# Patient Record
Sex: FEMALE | Race: WHITE | Employment: OTHER | URBAN - METROPOLITAN AREA
[De-identification: names, ages, dates, MRNs, and addresses within clinical notes are randomized per-mention and may not be internally consistent; named-entity substitution may affect disease eponyms.]

---

## 2017-02-13 ENCOUNTER — OFFICE VISIT (OUTPATIENT)
Dept: FAMILY MEDICINE CLINIC | Facility: CLINIC | Age: 67
End: 2017-02-13

## 2017-02-13 VITALS
HEIGHT: 59 IN | HEART RATE: 68 BPM | DIASTOLIC BLOOD PRESSURE: 70 MMHG | BODY MASS INDEX: 29.64 KG/M2 | RESPIRATION RATE: 14 BRPM | SYSTOLIC BLOOD PRESSURE: 120 MMHG | OXYGEN SATURATION: 99 % | WEIGHT: 147 LBS | TEMPERATURE: 97 F

## 2017-02-13 DIAGNOSIS — Z12.31 ENCOUNTER FOR SCREENING MAMMOGRAM FOR BREAST CANCER: ICD-10-CM

## 2017-02-13 DIAGNOSIS — R92.8 ABNORMAL FINDING ON MAMMOGRAPHY: ICD-10-CM

## 2017-02-13 DIAGNOSIS — Z12.11 SCREEN FOR COLON CANCER: ICD-10-CM

## 2017-02-13 DIAGNOSIS — Z00.00 MEDICARE ANNUAL WELLNESS VISIT, INITIAL: Primary | ICD-10-CM

## 2017-02-13 DIAGNOSIS — R92.2 BREAST DENSITY: ICD-10-CM

## 2017-02-13 DIAGNOSIS — R73.9 HYPERGLYCEMIA: ICD-10-CM

## 2017-02-13 PROCEDURE — 99213 OFFICE O/P EST LOW 20 MIN: CPT | Performed by: FAMILY MEDICINE

## 2017-02-13 PROCEDURE — 90732 PPSV23 VACC 2 YRS+ SUBQ/IM: CPT | Performed by: FAMILY MEDICINE

## 2017-02-13 PROCEDURE — G0439 PPPS, SUBSEQ VISIT: HCPCS | Performed by: FAMILY MEDICINE

## 2017-02-13 PROCEDURE — G0009 ADMIN PNEUMOCOCCAL VACCINE: HCPCS | Performed by: FAMILY MEDICINE

## 2017-02-13 NOTE — PROGRESS NOTES
HPI:   Nickolas Ewing is a 78year old female who presents for a Medicare Initial Preventative Physical Exam (Welcome to Medicare- < 12 months on Medicare) and discuss cough, and hyperglycemia,  Abnormal mammogram, osteopenia.     Pt got sick at the end HGB 12.8 01/27/2016   .0 01/27/2016        ALLERGIES:   She has No Known Allergies.     CURRENT MEDICATIONS:     Outpatient Prescriptions Marked as Taking for the 2/13/17 encounter (Office Visit) with Lamin Jules MD:  Mometasone Furoate (AS Acuity  Right Eye Visual Acuity: Uncorrected Right Eye Chart Acuity: 20/20   Left Eye Visual Acuity: Uncorrected Left Eye Chart Acuity: 20/50   Both Eyes Visual Acuity: Uncorrected Both Eyes Chart Acuity: 20/20   Able To Tolerate Visual Acuity: No (started for this visit:    Medicare annual wellness visit, initial    Breast density  -     TRACI SRIDHAR 2D+3D DIAGNOSTIC TRACI  BILAT (CPT=77066/92323); Future    Abnormal finding on mammography  -     TRACI SRIDHAR 2D+3D DIAGNOSTIC TRACI  BILAT (CPT=77066/82364);  Future    E mental well-being?: Social Interaction; Visiting Friends; Visiting Family    If you are a male age 38-65 or a female age 47-67, do you take aspirin?: No    Have you had any immunizations at another office such as Influenza, Hepatitis B, Tetanus, or Pneumococ What day of the week is this?: Correct    What month is it?: Correct    What year is it?: Correct    Recall \"Ball\": Correct    Recall \"Flag\": Correct    Recall \"Tree\": Correct       This section provided for quick review of chart, separate sheet to previous visit.  Update Immunization Activity if applicable    Pneumoccocal 13 (Prevnar)   Orders placed or performed in visit on 01/18/16  -PNEUMOCOCCAL VACC, 13 LOGAN IM         Pneumococcal 23 (Pneumovax)   Orders placed or performed in visit on 02/13/17

## 2017-02-13 NOTE — PATIENT INSTRUCTIONS
Return for fasting blood work. Call [phone] to schedule Mammogram.  Call GI to schedule colonoscopy. Use Asmanex 1 puff twice a day for 1- 2 weeks, then stop. Calcium Citrate 1000 mg daily with Vit D 3 1000 units daily.      [facility] following criteria:   • Men who are 73-68 years old and have smoked more than 100 cigarettes in their lifetime   • Anyone with a family history    Colorectal Cancer Screening  Covered up to Age 76     Colonoscopy Screen   Covered every 10 years- more often Annually No orders found for this or any previous visit.  Please get every year    Pneumococcal 13 (Prevnar)  Covered Once after 65   Orders placed or performed in visit on 01/18/16  -PNEUMOCOCCAL VACC, 13 LOGAN IM    Please get once after your 65th birthday

## 2017-02-16 ENCOUNTER — APPOINTMENT (OUTPATIENT)
Dept: LAB | Age: 67
End: 2017-02-16
Attending: FAMILY MEDICINE
Payer: MEDICARE

## 2017-02-16 DIAGNOSIS — R73.9 HYPERGLYCEMIA: ICD-10-CM

## 2017-02-16 LAB
ALBUMIN SERPL-MCNC: 3.6 G/DL (ref 3.5–4.8)
ALP LIVER SERPL-CCNC: 123 U/L (ref 55–142)
ALT SERPL-CCNC: 56 U/L (ref 14–54)
AST SERPL-CCNC: 27 U/L (ref 15–41)
BILIRUB SERPL-MCNC: 0.5 MG/DL (ref 0.1–2)
BUN BLD-MCNC: 17 MG/DL (ref 8–20)
CALCIUM BLD-MCNC: 9.1 MG/DL (ref 8.3–10.3)
CHLORIDE: 104 MMOL/L (ref 101–111)
CO2: 27 MMOL/L (ref 22–32)
CREAT BLD-MCNC: 0.74 MG/DL (ref 0.55–1.02)
EST. AVERAGE GLUCOSE BLD GHB EST-MCNC: 192 MG/DL (ref 68–126)
GLUCOSE BLD-MCNC: 189 MG/DL (ref 70–99)
HBA1C MFR BLD HPLC: 8.3 % (ref ?–5.7)
M PROTEIN MFR SERPL ELPH: 7.3 G/DL (ref 6.1–8.3)
POTASSIUM SERPL-SCNC: 4.2 MMOL/L (ref 3.6–5.1)
SODIUM SERPL-SCNC: 138 MMOL/L (ref 136–144)

## 2017-02-16 PROCEDURE — 36415 COLL VENOUS BLD VENIPUNCTURE: CPT

## 2017-02-16 PROCEDURE — 80053 COMPREHEN METABOLIC PANEL: CPT

## 2017-02-16 PROCEDURE — 83036 HEMOGLOBIN GLYCOSYLATED A1C: CPT

## 2017-02-27 ENCOUNTER — OFFICE VISIT (OUTPATIENT)
Dept: FAMILY MEDICINE CLINIC | Facility: CLINIC | Age: 67
End: 2017-02-27

## 2017-02-27 VITALS
BODY MASS INDEX: 29.84 KG/M2 | TEMPERATURE: 98 F | HEIGHT: 59 IN | DIASTOLIC BLOOD PRESSURE: 60 MMHG | HEART RATE: 76 BPM | OXYGEN SATURATION: 98 % | RESPIRATION RATE: 16 BRPM | WEIGHT: 148 LBS | SYSTOLIC BLOOD PRESSURE: 100 MMHG

## 2017-02-27 DIAGNOSIS — E11.9 NEW ONSET TYPE 2 DIABETES MELLITUS (HCC): Primary | ICD-10-CM

## 2017-02-27 DIAGNOSIS — R79.89 ELEVATED LIVER FUNCTION TESTS: ICD-10-CM

## 2017-02-27 PROCEDURE — 99214 OFFICE O/P EST MOD 30 MIN: CPT | Performed by: FAMILY MEDICINE

## 2017-02-27 RX ORDER — METFORMIN HYDROCHLORIDE 500 MG/1
500 TABLET, EXTENDED RELEASE ORAL
Qty: 90 TABLET | Refills: 1 | Status: SHIPPED | OUTPATIENT
Start: 2017-02-27 | End: 2017-04-06

## 2017-02-27 NOTE — PATIENT INSTRUCTIONS
Metformin extended release 500 mg 1 tablet daily with breakfast.  Get a glucometer test strips and lancets and see diabetic education for education on diabetes and diet. Low carbohydrate diet. Decreased breath rice pasta and potatoes and sweets.   Eye do

## 2017-02-27 NOTE — PROGRESS NOTES
Cara Joseph is a 78year old female. cc new-onset diabetes elevated liver tests  HPI:   Patient is coming to the office to discuss her blood test results. Hemoglobin A1c was elevated at 8.3 blood sugar was 189.   She is a family history of diabetes in h bruits  LUNGS: clear to auscultation  CARDIO: RRR without murmur  GI: good BS's,no masses, HSM or tenderness  EXTREMITIES: no cyanosis, clubbing or edema   Feet examination was performed with socks and shoes off- Dryness of the skin of the feet, no excoria these issues and agrees to the plan. The patient is asked to return in 1 month. Time spent was 25 min, more than 50% was spent on counseling regarding medical conditions and treatment.

## 2017-03-03 ENCOUNTER — NURSE ONLY (OUTPATIENT)
Dept: ENDOCRINOLOGY CLINIC | Facility: CLINIC | Age: 67
End: 2017-03-03

## 2017-03-03 VITALS — BODY MASS INDEX: 30 KG/M2 | WEIGHT: 149 LBS

## 2017-03-03 DIAGNOSIS — E11.9 NEW ONSET TYPE 2 DIABETES MELLITUS (HCC): Primary | ICD-10-CM

## 2017-03-03 PROCEDURE — G0108 DIAB MANAGE TRN  PER INDIV: HCPCS | Performed by: DIETITIAN, REGISTERED

## 2017-03-03 RX ORDER — LANCETS 28 GAUGE
EACH MISCELLANEOUS
Qty: 1 BOX | Refills: 0 | COMMUNITY
Start: 2017-03-03 | End: 2017-08-24

## 2017-03-03 NOTE — PROGRESS NOTES
Chely Cheng  : 3/2/1950 attended Step 1 Diabetic Education:    Date: 3/3/2017   Start time: 10am End time: 11:30am    Wt 149 lb      HGBA1C (%)   Date Value   2017 8.3*   ----------     Depression Screen - 0    Diabetes Overview, pathophysiolo

## 2017-03-15 ENCOUNTER — DIABETIC EDUCATION (OUTPATIENT)
Dept: ENDOCRINOLOGY CLINIC | Facility: CLINIC | Age: 67
End: 2017-03-15

## 2017-03-15 DIAGNOSIS — E11.9 NEW ONSET TYPE 2 DIABETES MELLITUS (HCC): Primary | ICD-10-CM

## 2017-03-15 PROCEDURE — G0109 DIAB MANAGE TRN IND/GROUP: HCPCS | Performed by: DIETITIAN, REGISTERED

## 2017-03-15 NOTE — PROGRESS NOTES
Jose Miguel Rodas  DOB3/2/1950 attended Step 2 Diabetic Education:    Date: 3/15/2017  Start time: 2 pm  End time: 4 pm      Diabetes Overview, pathophysiology, pre-diabetes, A1C results and treatment options for diabetes self-management, types of diabetes a

## 2017-03-22 ENCOUNTER — DIABETIC EDUCATION (OUTPATIENT)
Dept: ENDOCRINOLOGY CLINIC | Facility: CLINIC | Age: 67
End: 2017-03-22

## 2017-03-22 DIAGNOSIS — E11.9 NEW ONSET TYPE 2 DIABETES MELLITUS (HCC): Primary | ICD-10-CM

## 2017-03-22 PROCEDURE — G0109 DIAB MANAGE TRN IND/GROUP: HCPCS | Performed by: DIETITIAN, REGISTERED

## 2017-03-22 NOTE — PROGRESS NOTES
Kevan Coleen  DOB3/2/1950 attended Step 3 Diabetic Education:    Date: 3/22/2017  Start time:  2 pm  End time: 3 pm      Prevention, detection and treatment of chronic complications  Reviewed how to reduce risks of complications including eye, foot, den

## 2017-03-30 ENCOUNTER — DIABETIC EDUCATION (OUTPATIENT)
Dept: ENDOCRINOLOGY CLINIC | Facility: CLINIC | Age: 67
End: 2017-03-30

## 2017-03-30 VITALS — WEIGHT: 147 LBS | BODY MASS INDEX: 29.64 KG/M2 | HEIGHT: 59 IN

## 2017-03-30 DIAGNOSIS — E11.9 NEW ONSET TYPE 2 DIABETES MELLITUS (HCC): Primary | ICD-10-CM

## 2017-03-30 PROCEDURE — 99420 DIABETES SCREEN: CPT | Performed by: DIETITIAN, REGISTERED

## 2017-03-30 NOTE — PROGRESS NOTES
3/30/17    CGM Research Study    Anayeli Canchola came for 1st visit. Reviewed and signed informed consent. Demographic and emotional questionnaire filled out. Reviewed calibration process and how to fill out food/glucose/activity log sheets.   She will return ne

## 2017-03-30 NOTE — PROGRESS NOTES
Juan Francisco Cano  DOB3/2/1950 was seen for  CGM Research Study    Date: 3/30/2017  Start time: 8:00   End time: 8:30    Assessment: Ht 59\"  Wt 147 lb  BMI 29.67 kg/m2    HGBA1C (%)   Date Value   02/16/2017 8.3*   ----------    Ale Banerjee came for 1st visit.

## 2017-04-06 ENCOUNTER — NURSE ONLY (OUTPATIENT)
Dept: ENDOCRINOLOGY CLINIC | Facility: CLINIC | Age: 67
End: 2017-04-06

## 2017-04-06 ENCOUNTER — OFFICE VISIT (OUTPATIENT)
Dept: FAMILY MEDICINE CLINIC | Facility: CLINIC | Age: 67
End: 2017-04-06

## 2017-04-06 VITALS
OXYGEN SATURATION: 99 % | TEMPERATURE: 97 F | HEART RATE: 98 BPM | RESPIRATION RATE: 16 BRPM | BODY MASS INDEX: 29.33 KG/M2 | SYSTOLIC BLOOD PRESSURE: 130 MMHG | HEIGHT: 59 IN | WEIGHT: 145.5 LBS | DIASTOLIC BLOOD PRESSURE: 70 MMHG

## 2017-04-06 DIAGNOSIS — E11.9 NEW ONSET TYPE 2 DIABETES MELLITUS (HCC): Primary | ICD-10-CM

## 2017-04-06 PROCEDURE — 99213 OFFICE O/P EST LOW 20 MIN: CPT | Performed by: FAMILY MEDICINE

## 2017-04-06 PROCEDURE — 99420 DIABETES SCREEN: CPT

## 2017-04-06 RX ORDER — METFORMIN HYDROCHLORIDE 1000 MG/1
1000 TABLET, FILM COATED, EXTENDED RELEASE ORAL
Qty: 90 TABLET | Refills: 1 | Status: SHIPPED | OUTPATIENT
Start: 2017-04-06 | End: 2017-08-24

## 2017-04-06 NOTE — PROGRESS NOTES
Bryce Sheehan is a 75year old female. c diabetes new onsetc   HPI:   Patient is coming for follow-up on diabetes. She is new onset diabetic. Her last hemoglobin A1c was 8.3. She is monitoring her sugars in the morning it is staying in the 180s.   Today normocephalic,ears and throat are clear  NECK: supple,no adenopathy,  LUNGS: clear to auscultation  CARDIO: RRR without murmur  GI: good BS's,no masses, HSM or tenderness  EXTREMITIES: no cyanosis, clubbing or edema  Psychiatric - alert  and oriented x3, n

## 2017-04-06 NOTE — PROGRESS NOTES
Julieth Schneider  DOB3/2/1950 was seen for Continuous Glucose Sensor Follow-up Visit:Pt here for research study.     Date: 4/6/2017        Sensor Type: Dexcom 4    Site Assessment:left side of abdomen without signs of redness and swelling    Reviewed CGMS do

## 2017-04-06 NOTE — PATIENT INSTRUCTIONS
Increase metformin to 1000 mg daily. Continue watching that for carbohydrates. Try to have one half of the glass of milk or yogurt at bedtime. Monitor blood sugar at home bring readings for the next visit.

## 2017-05-03 ENCOUNTER — APPOINTMENT (OUTPATIENT)
Dept: ENDOCRINOLOGY CLINIC | Facility: CLINIC | Age: 67
End: 2017-05-03

## 2017-05-03 PROCEDURE — 99420 DIABETES SCREEN: CPT | Performed by: DIETITIAN, REGISTERED

## 2017-05-03 PROCEDURE — G0108 DIAB MANAGE TRN  PER INDIV: HCPCS | Performed by: DIETITIAN, REGISTERED

## 2017-05-03 NOTE — PROGRESS NOTES
5/3/17    Dexcom CGM Research Study    Massiel came for 2nd application of Dexcom sensor. Reviewed calibration process and the screens. Provided additional food/actvity logs. Will return next week for download and Step 4.     Clinton Echevarria RD, LDN, CDE

## 2017-05-04 ENCOUNTER — DIABETIC EDUCATION (OUTPATIENT)
Dept: ENDOCRINOLOGY CLINIC | Facility: CLINIC | Age: 67
End: 2017-05-04

## 2017-05-04 DIAGNOSIS — E11.9 NEW ONSET TYPE 2 DIABETES MELLITUS (HCC): Primary | ICD-10-CM

## 2017-05-09 ENCOUNTER — DIABETIC EDUCATION (OUTPATIENT)
Dept: ENDOCRINOLOGY CLINIC | Facility: CLINIC | Age: 67
End: 2017-05-09

## 2017-05-09 VITALS — WEIGHT: 145 LBS | BODY MASS INDEX: 29.23 KG/M2 | HEIGHT: 59 IN

## 2017-05-09 DIAGNOSIS — E11.9 NEW ONSET TYPE 2 DIABETES MELLITUS (HCC): Primary | ICD-10-CM

## 2017-05-09 PROCEDURE — 99420 DIABETES SCREEN: CPT | Performed by: DIETITIAN, REGISTERED

## 2017-05-09 NOTE — PROGRESS NOTES
Sallie Hernandez  :3/2/1950 attended Step 4 Diabetic Education and Dexcom Download    Date: 2017  Start time: 2:00   End time:       Ht 59\"  Wt 145 lb  BMI 29.27 kg/m2  Weight Change:  Down 4 lb      HGBA1C (%)   Date Value   2017 8.3*   -----

## 2017-07-28 ENCOUNTER — OFFICE VISIT (OUTPATIENT)
Dept: FAMILY MEDICINE CLINIC | Facility: CLINIC | Age: 67
End: 2017-07-28

## 2017-07-28 VITALS
HEART RATE: 73 BPM | RESPIRATION RATE: 16 BRPM | BODY MASS INDEX: 28.83 KG/M2 | SYSTOLIC BLOOD PRESSURE: 130 MMHG | DIASTOLIC BLOOD PRESSURE: 74 MMHG | TEMPERATURE: 98 F | HEIGHT: 59 IN | WEIGHT: 143 LBS

## 2017-07-28 DIAGNOSIS — R06.83 SNORING: ICD-10-CM

## 2017-07-28 DIAGNOSIS — R53.83 FATIGUE, UNSPECIFIED TYPE: ICD-10-CM

## 2017-07-28 DIAGNOSIS — R10.31 ABDOMINAL DISCOMFORT IN RIGHT LOWER QUADRANT: ICD-10-CM

## 2017-07-28 DIAGNOSIS — E11.9 TYPE 2 DIABETES MELLITUS WITHOUT COMPLICATION, WITHOUT LONG-TERM CURRENT USE OF INSULIN (HCC): Primary | ICD-10-CM

## 2017-07-28 DIAGNOSIS — M85.80 OSTEOPENIA, UNSPECIFIED LOCATION: ICD-10-CM

## 2017-07-28 PROCEDURE — 99214 OFFICE O/P EST MOD 30 MIN: CPT | Performed by: FAMILY MEDICINE

## 2017-07-28 RX ORDER — METFORMIN HYDROCHLORIDE 500 MG/1
500 TABLET, EXTENDED RELEASE ORAL DAILY
Qty: 30 TABLET | Refills: 3 | Status: SHIPPED | OUTPATIENT
Start: 2017-07-28 | End: 2017-08-24

## 2017-07-28 NOTE — PATIENT INSTRUCTIONS
Decrease metformin extended release to 500 mg daily. Start Tradjenta 5 mg 1 tablet daily with breakfast.  Monitor blood sugar in the morning and sometimes 2 hours after food.   Blood sugar goal in the morning is less than 130, blood sugar goal 2 hours afte

## 2017-07-28 NOTE — PROGRESS NOTES
Elizabeth Canseco is a 75year old female. cc diabetes, abdominal discomfort, D, osteopenia, elevated liver test, tiredness fatigue , snoring  HPI:   Patient is coming in for follow-up of diabetes. She did not do the blood work as recommended.   Trying to mon Disp: 30 tablet Rfl: 3   Linagliptin (TRADJENTA) 5 MG Oral Tab Take 1 tablet by mouth daily.  Disp: 30 tablet Rfl: 3   MetFORMIN HCl ER, MOD, 1000 MG (MOD) Oral Tablet 24 Hr Take 1 tablet (1,000 mg total) by mouth daily with breakfast. Disp: 90 tablet Rfl: GFR 84 >=60   Calcium, Total 9.1 8.3 - 10.3 mg/dL   Alkaline Phosphatase 123 55 - 142 U/L   AST 27 15 - 41 U/L   Alt 56 (H) 14 - 54 U/L   Bilirubin, Total 0.5 0.1 - 2.0 mg/dL   Total Protein 7.3 6.1 - 8.3 g/dL   Albumin 3.6 3.5 - 4.8 g/dL   Sodium 138 13 probiotic daily lke Align. Do sleep questionnaire and bring it to the office. We will consider doing sleep study. Imaging & Consults:  None    The patient indicates understanding of these issues and agrees to the plan.   The patient is asked to return

## 2017-08-01 ENCOUNTER — LABORATORY ENCOUNTER (OUTPATIENT)
Dept: LAB | Age: 67
End: 2017-08-01
Attending: FAMILY MEDICINE
Payer: MEDICARE

## 2017-08-01 ENCOUNTER — APPOINTMENT (OUTPATIENT)
Dept: CT IMAGING | Age: 67
End: 2017-08-01
Attending: FAMILY MEDICINE
Payer: MEDICARE

## 2017-08-01 ENCOUNTER — HOSPITAL ENCOUNTER (OUTPATIENT)
Age: 67
Discharge: HOME | End: 2017-08-01
Attending: FAMILY MEDICINE
Payer: MEDICARE

## 2017-08-01 VITALS
HEART RATE: 73 BPM | TEMPERATURE: 98 F | DIASTOLIC BLOOD PRESSURE: 88 MMHG | SYSTOLIC BLOOD PRESSURE: 137 MMHG | OXYGEN SATURATION: 97 % | RESPIRATION RATE: 18 BRPM

## 2017-08-01 DIAGNOSIS — E11.9 TYPE 2 DIABETES MELLITUS WITHOUT COMPLICATION, WITHOUT LONG-TERM CURRENT USE OF INSULIN (HCC): ICD-10-CM

## 2017-08-01 DIAGNOSIS — K59.00 CONSTIPATION, UNSPECIFIED CONSTIPATION TYPE: Primary | ICD-10-CM

## 2017-08-01 DIAGNOSIS — E11.9 NEW ONSET TYPE 2 DIABETES MELLITUS (HCC): ICD-10-CM

## 2017-08-01 DIAGNOSIS — R53.83 FATIGUE, UNSPECIFIED TYPE: ICD-10-CM

## 2017-08-01 DIAGNOSIS — M85.80 OSTEOPENIA, UNSPECIFIED LOCATION: ICD-10-CM

## 2017-08-01 DIAGNOSIS — R79.89 ELEVATED LIVER FUNCTION TESTS: ICD-10-CM

## 2017-08-01 LAB
25-HYDROXYVITAMIN D (TOTAL): 16.7 NG/ML (ref 30–100)
ALBUMIN SERPL-MCNC: 3.7 G/DL (ref 3.5–4.8)
ALP LIVER SERPL-CCNC: 123 U/L (ref 55–142)
ALT SERPL-CCNC: 31 U/L (ref 14–54)
AST SERPL-CCNC: 13 U/L (ref 15–41)
BASOPHILS # BLD AUTO: 0.11 X10(3) UL (ref 0–0.1)
BASOPHILS NFR BLD AUTO: 0.9 %
BILIRUB SERPL-MCNC: 0.2 MG/DL (ref 0.1–2)
BUN BLD-MCNC: 24 MG/DL (ref 8–20)
CALCIUM BLD-MCNC: 9.8 MG/DL (ref 8.3–10.3)
CHLORIDE: 105 MMOL/L (ref 101–111)
CHOLEST SMN-MCNC: 195 MG/DL (ref ?–200)
CO2: 25 MMOL/L (ref 22–32)
CREAT BLD-MCNC: 0.67 MG/DL (ref 0.55–1.02)
CREAT UR-SCNC: 142 MG/DL
EOSINOPHIL # BLD AUTO: 0.22 X10(3) UL (ref 0–0.3)
EOSINOPHIL NFR BLD AUTO: 1.8 %
ERYTHROCYTE [DISTWIDTH] IN BLOOD BY AUTOMATED COUNT: 13.9 % (ref 11.5–16)
EST. AVERAGE GLUCOSE BLD GHB EST-MCNC: 148 MG/DL (ref 68–126)
GLUCOSE BLD-MCNC: 149 MG/DL (ref 70–99)
HAV AB SERPL IA-ACNC: 519 PG/ML (ref 193–986)
HBA1C MFR BLD HPLC: 6.8 % (ref ?–5.7)
HCT VFR BLD AUTO: 44.5 % (ref 34–50)
HDLC SERPL-MCNC: 56 MG/DL (ref 45–?)
HDLC SERPL: 3.48 {RATIO} (ref ?–4.44)
HGB BLD-MCNC: 14 G/DL (ref 12–16)
IMMATURE GRANULOCYTE COUNT: 0.12 X10(3) UL (ref 0–1)
IMMATURE GRANULOCYTE RATIO %: 1 %
LDLC SERPL CALC-MCNC: 119 MG/DL (ref ?–130)
LDLC SERPL-MCNC: 20 MG/DL (ref 5–40)
LYMPHOCYTES # BLD AUTO: 2.48 X10(3) UL (ref 0.9–4)
LYMPHOCYTES NFR BLD AUTO: 20.3 %
M PROTEIN MFR SERPL ELPH: 7.9 G/DL (ref 6.1–8.3)
MCH RBC QN AUTO: 28.6 PG (ref 27–33.2)
MCHC RBC AUTO-ENTMCNC: 31.5 G/DL (ref 31–37)
MCV RBC AUTO: 91 FL (ref 81–100)
MICROALBUMIN UR-MCNC: 1.28 MG/DL
MICROALBUMIN/CREAT 24H UR-RTO: 9 UG/MG (ref ?–30)
MONOCYTES # BLD AUTO: 0.78 X10(3) UL (ref 0.1–0.6)
MONOCYTES NFR BLD AUTO: 6.4 %
NEUTROPHIL ABS PRELIM: 8.53 X10 (3) UL (ref 1.3–6.7)
NEUTROPHILS # BLD AUTO: 8.53 X10(3) UL (ref 1.3–6.7)
NEUTROPHILS NFR BLD AUTO: 69.6 %
NONHDLC SERPL-MCNC: 139 MG/DL (ref ?–130)
PLATELET # BLD AUTO: 400 10(3)UL (ref 150–450)
POCT BILIRUBIN URINE: NEGATIVE
POCT BLOOD URINE: NEGATIVE
POCT GLUCOSE URINE: NEGATIVE MG/DL
POCT KETONE URINE: NEGATIVE MG/DL
POCT NITRITE URINE: NEGATIVE
POCT PH URINE: 5.5 (ref 5–8)
POCT PROTEIN URINE: NEGATIVE MG/DL
POCT SPECIFIC GRAVITY URINE: 1.03
POCT URINE COLOR: YELLOW
POCT UROBILINOGEN URINE: 0.2 MG/DL
POTASSIUM SERPL-SCNC: 4.6 MMOL/L (ref 3.6–5.1)
RBC # BLD AUTO: 4.89 X10(6)UL (ref 3.8–5.1)
RED CELL DISTRIBUTION WIDTH-SD: 46.7 FL (ref 35.1–46.3)
SODIUM SERPL-SCNC: 138 MMOL/L (ref 136–144)
TRIGLYCERIDES: 100 MG/DL (ref ?–150)
TSI SER-ACNC: 1.81 MIU/ML (ref 0.35–5.5)
WBC # BLD AUTO: 12.2 X10(3) UL (ref 4–13)

## 2017-08-01 PROCEDURE — 74176 CT ABD & PELVIS W/O CONTRAST: CPT | Performed by: FAMILY MEDICINE

## 2017-08-01 PROCEDURE — 82570 ASSAY OF URINE CREATININE: CPT

## 2017-08-01 PROCEDURE — 82043 UR ALBUMIN QUANTITATIVE: CPT

## 2017-08-01 PROCEDURE — 85025 COMPLETE CBC W/AUTO DIFF WBC: CPT

## 2017-08-01 PROCEDURE — 82607 VITAMIN B-12: CPT

## 2017-08-01 PROCEDURE — 84443 ASSAY THYROID STIM HORMONE: CPT

## 2017-08-01 PROCEDURE — 99214 OFFICE O/P EST MOD 30 MIN: CPT

## 2017-08-01 PROCEDURE — 36415 COLL VENOUS BLD VENIPUNCTURE: CPT

## 2017-08-01 PROCEDURE — 80053 COMPREHEN METABOLIC PANEL: CPT

## 2017-08-01 PROCEDURE — 82306 VITAMIN D 25 HYDROXY: CPT

## 2017-08-01 PROCEDURE — 83036 HEMOGLOBIN GLYCOSYLATED A1C: CPT

## 2017-08-01 PROCEDURE — 87086 URINE CULTURE/COLONY COUNT: CPT | Performed by: FAMILY MEDICINE

## 2017-08-01 PROCEDURE — 80061 LIPID PANEL: CPT

## 2017-08-01 PROCEDURE — 81002 URINALYSIS NONAUTO W/O SCOPE: CPT | Performed by: FAMILY MEDICINE

## 2017-08-01 NOTE — ED INITIAL ASSESSMENT (HPI)
Pt c/o abdominal/flank/lower back pain since Sunday. Denies injury. Denies any urinary frequency, urgency, pain with urination. Denies fever, n/v, diarrhea.

## 2017-08-02 ENCOUNTER — TELEPHONE (OUTPATIENT)
Dept: FAMILY MEDICINE CLINIC | Facility: CLINIC | Age: 67
End: 2017-08-02

## 2017-08-02 DIAGNOSIS — E55.9 VITAMIN D DEFICIENCY: ICD-10-CM

## 2017-08-02 DIAGNOSIS — E11.9 TYPE 2 DIABETES MELLITUS WITHOUT COMPLICATION, WITHOUT LONG-TERM CURRENT USE OF INSULIN (HCC): Primary | ICD-10-CM

## 2017-08-02 RX ORDER — ERGOCALCIFEROL 1.25 MG/1
50000 CAPSULE ORAL WEEKLY
Qty: 12 CAPSULE | Refills: 0 | Status: SHIPPED | OUTPATIENT
Start: 2017-08-02 | End: 2017-09-01

## 2017-08-02 NOTE — TELEPHONE ENCOUNTER
We can call Tradjenta 5 mg to her local pharmacy and see what would be the pricing of the will be acceptable will be good medication to start with. If patient wants to do different medication we can call Januvia 50 mg 1 tablet daily #30, 2 refills.   Thank

## 2017-08-02 NOTE — TELEPHONE ENCOUNTER
Lm for pt to call back and clarify what pharmacy she is using as the RX for [address] as it went to her local [facility] but we received a phone call from [facility] care pharmacy stating they do not carry [address]  Spoke [facility] from [facility]

## 2017-08-02 NOTE — ED PROVIDER NOTES
Patient Seen in: [facility]    History   Patient presents with:  Abdomen/Flank Pain (GI/)    Stated Complaint: pt has had pain in mid section thru back since sunday     HPI  76 yo F here with complaints of diffuse lower abdomi Other systems are as noted in HPI. Constitutional and vital signs reviewed. All other systems reviewed and negative except as noted above. PSFH elements reviewed from today and agreed except as otherwise stated in HPI.     Physical Exam   ED Tria Solution          Sig: Take 296 mL by mouth once. Dispense:  296 mL          Refill:  0  Ct Abdomen+pelvis(cpt=74176)    Result Date: 8/1/2017  PROCEDURE:  CT ABDOMEN+PELVIS(CPT=74176)  COMPARISON:  None.   INDICATIONS:  LLQ abdominal pain radiatin lesion, or calculus. PELVIC NODES:  Normal.  No adenopathy. PELVIC ORGANS:  Normal.  No visible mass. Pelvic organs appropriate for patient age. BONES:  No fracture. Multiple level degenerative disc disease. Mild anterior spondylolisthesis of L4 on L5. in diet. Use Miralax one scoop in 8 ounces of water or juice once daily until diarrhea occurs then 1-2 times per week to keep stools soft.       Disposition and Plan     Clinical Impression:  Constipation, unspecified constipation type  (primary encounter

## 2017-08-02 NOTE — TELEPHONE ENCOUNTER
Lm for pt that Pete Rosas is not covered by her insurance per [facility] [employer] [facility] at [facility]. Januvia RX pended to go to [facility].

## 2017-08-03 ENCOUNTER — TELEPHONE (OUTPATIENT)
Dept: FAMILY MEDICINE CLINIC | Facility: CLINIC | Age: 67
End: 2017-08-03

## 2017-08-03 NOTE — TELEPHONE ENCOUNTER
I would suggest to tried ibuprofen 200 mg 2 tablets with breakfast lunch and dinner. Call us tomorrow if with update if not better will see her tomorrow . Otherwise I would like her to schedule appointment for the next week if still having pain.

## 2017-08-03 NOTE — TELEPHONE ENCOUNTER
Pt complains of \"severe pain\" down her right side toward her back which moves to the front depending on movement. States she has pain when walking, sitting and laying down.  When she is sitting and starts to stand pain is a level of 10 on a scale of 1 to

## 2017-08-03 NOTE — TELEPHONE ENCOUNTER
C/o of R hip area pain since Sun worst with movement radiates to her back then to her front, better today as reported  Denies trauma, fever, N/V, eating ok, no urinary s/s     Pls advise if can accommodate pt today or send to   Thank you.

## 2017-08-24 RX ORDER — METFORMIN HYDROCHLORIDE 500 MG/1
500 TABLET, EXTENDED RELEASE ORAL DAILY
Qty: 90 TABLET | Refills: 1 | Status: SHIPPED | OUTPATIENT
Start: 2017-08-24 | End: 2017-11-20

## 2017-08-24 RX ORDER — METFORMIN HYDROCHLORIDE 1000 MG/1
1000 TABLET, FILM COATED, EXTENDED RELEASE ORAL
Qty: 90 TABLET | Refills: 1 | Status: SHIPPED | OUTPATIENT
Start: 2017-08-24 | End: 2017-11-20 | Stop reason: ALTCHOICE

## 2017-08-24 RX ORDER — LANCETS 28 GAUGE
EACH MISCELLANEOUS
Qty: 100 EACH | Refills: 2 | Status: SHIPPED | OUTPATIENT
Start: 2017-08-24 | End: 2018-06-11

## 2017-09-05 ENCOUNTER — TELEPHONE (OUTPATIENT)
Dept: FAMILY MEDICINE CLINIC | Facility: CLINIC | Age: 67
End: 2017-09-05

## 2017-09-05 NOTE — TELEPHONE ENCOUNTER
Received request for a PA to be done for pt's Trajenta/ Linagliptin 5mg tab.    PA completed and sent to plan via covermymeds

## 2017-09-06 NOTE — TELEPHONE ENCOUNTER
Alternative medication Januvia 50 mg was prescribed on 8/2/2017. I spoke with [facility] pharmacist(Jenniffer) to confirm,   Patient notified, understanding verbalized.

## 2017-10-03 ENCOUNTER — HOSPITAL ENCOUNTER (OUTPATIENT)
Dept: CARDIOLOGY CLINIC | Facility: HOSPITAL | Age: 67
Discharge: HOME | End: 2017-10-03
Attending: FAMILY MEDICINE

## 2017-10-03 DIAGNOSIS — Z13.6 SCREENING FOR CARDIOVASCULAR CONDITION: ICD-10-CM

## 2017-10-04 DIAGNOSIS — E11.9 TYPE 2 DIABETES MELLITUS WITHOUT COMPLICATION, WITHOUT LONG-TERM CURRENT USE OF INSULIN (HCC): Primary | ICD-10-CM

## 2017-10-05 ENCOUNTER — TELEPHONE (OUTPATIENT)
Dept: FAMILY MEDICINE CLINIC | Facility: CLINIC | Age: 67
End: 2017-10-05

## 2017-10-05 DIAGNOSIS — I65.22 STENOSIS OF LEFT CAROTID ARTERY: Primary | ICD-10-CM

## 2017-10-05 NOTE — TELEPHONE ENCOUNTER
Please authorize pended orders . Pt notified she needs to have a carotid doppler and fasting labs prior to her visit with Dr. Mata Harden in November.

## 2017-10-27 ENCOUNTER — HOSPITAL ENCOUNTER (OUTPATIENT)
Dept: ULTRASOUND IMAGING | Facility: HOSPITAL | Age: 67
Discharge: HOME | End: 2017-10-27
Attending: FAMILY MEDICINE
Payer: MEDICARE

## 2017-10-27 ENCOUNTER — APPOINTMENT (OUTPATIENT)
Dept: LAB | Facility: HOSPITAL | Age: 67
End: 2017-10-27
Attending: FAMILY MEDICINE
Payer: MEDICARE

## 2017-10-27 DIAGNOSIS — E55.9 VITAMIN D DEFICIENCY: ICD-10-CM

## 2017-10-27 DIAGNOSIS — E11.9 TYPE 2 DIABETES MELLITUS WITHOUT COMPLICATION, WITHOUT LONG-TERM CURRENT USE OF INSULIN (HCC): ICD-10-CM

## 2017-10-27 DIAGNOSIS — I65.22 STENOSIS OF LEFT CAROTID ARTERY: ICD-10-CM

## 2017-10-27 PROCEDURE — 82306 VITAMIN D 25 HYDROXY: CPT

## 2017-10-27 PROCEDURE — 93880 EXTRACRANIAL BILAT STUDY: CPT | Performed by: FAMILY MEDICINE

## 2017-10-27 PROCEDURE — 80053 COMPREHEN METABOLIC PANEL: CPT

## 2017-10-27 PROCEDURE — 83036 HEMOGLOBIN GLYCOSYLATED A1C: CPT

## 2017-10-27 PROCEDURE — 36415 COLL VENOUS BLD VENIPUNCTURE: CPT

## 2017-10-27 PROCEDURE — 80061 LIPID PANEL: CPT

## 2017-11-13 ENCOUNTER — HOSPITAL ENCOUNTER (OUTPATIENT)
Dept: CT IMAGING | Facility: HOSPITAL | Age: 67
Discharge: HOME | End: 2017-11-13
Attending: FAMILY MEDICINE

## 2017-11-13 DIAGNOSIS — Z12.31 ENCOUNTER FOR SCREENING MAMMOGRAM FOR MALIGNANT NEOPLASM OF BREAST: ICD-10-CM

## 2017-11-20 ENCOUNTER — OFFICE VISIT (OUTPATIENT)
Dept: FAMILY MEDICINE CLINIC | Facility: CLINIC | Age: 67
End: 2017-11-20

## 2017-11-20 VITALS
DIASTOLIC BLOOD PRESSURE: 74 MMHG | RESPIRATION RATE: 16 BRPM | HEART RATE: 82 BPM | WEIGHT: 141 LBS | BODY MASS INDEX: 28 KG/M2 | OXYGEN SATURATION: 99 % | TEMPERATURE: 98 F | SYSTOLIC BLOOD PRESSURE: 112 MMHG

## 2017-11-20 DIAGNOSIS — E78.2 HYPERLIPIDEMIA, MIXED: ICD-10-CM

## 2017-11-20 DIAGNOSIS — E11.9 TYPE 2 DIABETES MELLITUS WITHOUT COMPLICATION, WITHOUT LONG-TERM CURRENT USE OF INSULIN (HCC): Primary | ICD-10-CM

## 2017-11-20 DIAGNOSIS — M25.511 ACUTE PAIN OF RIGHT SHOULDER: ICD-10-CM

## 2017-11-20 DIAGNOSIS — M79.601 RIGHT ARM PAIN: ICD-10-CM

## 2017-11-20 DIAGNOSIS — I65.22 ASYMPTOMATIC CAROTID ARTERY NARROWING WITHOUT INFARCTION, LEFT: ICD-10-CM

## 2017-11-20 PROCEDURE — 99214 OFFICE O/P EST MOD 30 MIN: CPT | Performed by: FAMILY MEDICINE

## 2017-11-20 RX ORDER — NAPROXEN 500 MG/1
500 TABLET ORAL 2 TIMES DAILY WITH MEALS
Qty: 40 TABLET | Refills: 0 | Status: SHIPPED | OUTPATIENT
Start: 2017-11-20 | End: 2018-03-19 | Stop reason: ALTCHOICE

## 2017-11-20 NOTE — PATIENT INSTRUCTIONS
Stop metformin. Increase Januvia to 100 mg daily. Call to schedule physical therapy. Naproxen 500 mg 1 tablet twice a day take it with food. Do fasting blood work one week prior next visit end of February/beginning of March 2018.

## 2017-11-20 NOTE — PROGRESS NOTES
Mirtha Cordova is a 75year old female. cc diabetes, hyperlipidemia, shoulder pain, narrowing of the carotid. HPI:   Diabetes patient is taking metformin she has stomach upset related to this. She has a pain around the abdomen going to the back.   No any unusual skin lesions or rashes  HEENT no congestion no runny nose no sore throat  Neck no neck pain  RESPIRATORY: denies shortness of breath with exertion  CARDIOVASCULAR: denies chest pain on exertion  GI: denies abdominal pain and denies heartburn, a <4.44   Non HDL Chol 141 (H) <130 mg/dL     ASSESSMENT AND PLAN:   Type 2 diabetes mellitus without complication, without long-term current use of insulin (hcc)  (primary encounter diagnosis)  Right arm pain  Acute pain of right shoulder  Hyperlipidemia, m

## 2018-01-05 ENCOUNTER — HOSPITAL ENCOUNTER (OUTPATIENT)
Dept: PHYSICAL THERAPY | Facility: HOSPITAL | Age: 68
Setting detail: THERAPIES SERIES
Discharge: HOME | End: 2018-01-05
Attending: FAMILY MEDICINE
Payer: MEDICARE

## 2018-01-05 DIAGNOSIS — M79.601 RIGHT ARM PAIN: ICD-10-CM

## 2018-01-05 DIAGNOSIS — M25.511 ACUTE PAIN OF RIGHT SHOULDER: ICD-10-CM

## 2018-01-05 PROCEDURE — 97530 THERAPEUTIC ACTIVITIES: CPT

## 2018-01-05 PROCEDURE — 97162 PT EVAL MOD COMPLEX 30 MIN: CPT

## 2018-01-05 NOTE — PROGRESS NOTES
UPPER EXTREMITY EVALUATION:   Referring Physician: Dr. Ramon Washington  Diagnosis: R arm pain; Acute pain right shoulder.      Date of Service: 1/5/2018     PATIENT SUMMARY   Elina Leung is a 75year old y/o female who presents to therapy today with c No pain.    -Spurling's test.    Palpation: tenderness right biceps tendon, sub-acromial space, and scalene mm. .. AROM:   Shoulder    Flexion while standing: R 95 with pain; L 143  HBB: R back pocket with pain; L T6.        PROM:   Shoulder    Flexion: restricted    Patient/Family/Caregiver was advised of these findings, precautions, and treatment options and has agreed to actively participate in planning and for this course of care. Thank you for your referral.   Please co-sign this note.   If you hav

## 2018-01-08 ENCOUNTER — HOSPITAL ENCOUNTER (OUTPATIENT)
Dept: PHYSICAL THERAPY | Facility: HOSPITAL | Age: 68
Setting detail: THERAPIES SERIES
Discharge: HOME | End: 2018-01-08
Attending: FAMILY MEDICINE
Payer: MEDICARE

## 2018-01-08 PROCEDURE — 97140 MANUAL THERAPY 1/> REGIONS: CPT

## 2018-01-08 PROCEDURE — 97110 THERAPEUTIC EXERCISES: CPT

## 2018-01-08 NOTE — PROGRESS NOTES
Dx: Right arm pain; Acute pain right shoulder. Authorized # of Visits:  8         Next MD visit: none scheduled  Fall Risk: standard         Precautions: n/a             Subjective: 3/10 shoulder and right upper arm pain this afternoon.    Notes int education.     Charges: ex2,manther1       Total Timed Treatment: 44 min  Total Treatment Time: 44 min

## 2018-01-10 ENCOUNTER — HOSPITAL ENCOUNTER (OUTPATIENT)
Dept: PHYSICAL THERAPY | Facility: HOSPITAL | Age: 68
Setting detail: THERAPIES SERIES
Discharge: HOME | End: 2018-01-10
Attending: FAMILY MEDICINE
Payer: MEDICARE

## 2018-01-10 PROCEDURE — 97140 MANUAL THERAPY 1/> REGIONS: CPT

## 2018-01-10 PROCEDURE — 97110 THERAPEUTIC EXERCISES: CPT

## 2018-01-10 NOTE — PROGRESS NOTES
Dx: Right arm pain; Acute pain right shoulder. Authorized # of Visits:  8         Next MD visit: none scheduled  Fall Risk: standard         Precautions: n/a             Subjective: 2/10 shoulder and right upper arm pain this afternoon.    States th side lying:  AA/PROM into SF, Scaption, and SE x 10 each. X 10 each. Right ER left side lying x 10. X 15 reps. .         Right scapular mobs left side lying by PT. Skilled Services: manual therapy, education.     Charges: Liliana Bee

## 2018-01-15 ENCOUNTER — HOSPITAL ENCOUNTER (OUTPATIENT)
Dept: PHYSICAL THERAPY | Facility: HOSPITAL | Age: 68
Setting detail: THERAPIES SERIES
Discharge: HOME | End: 2018-01-15
Attending: FAMILY MEDICINE
Payer: MEDICARE

## 2018-01-15 PROCEDURE — 97140 MANUAL THERAPY 1/> REGIONS: CPT

## 2018-01-15 PROCEDURE — 97110 THERAPEUTIC EXERCISES: CPT

## 2018-01-15 NOTE — PROGRESS NOTES
Dx: Right arm pain; Acute pain right shoulder.          Authorized # of Visits:  8         Next MD visit: none scheduled  Fall Risk: standard         Precautions: n/a             Subjective: 2/10 shoulder and right upper arm soreness/pain this afternoon, bu mobs by PT. PROM right shoulder by PT. PROM right shoulder by PT. PROM right shoulder by PT. Active IR/ER supine in scapular plane x 10 each. X 15 each. X 15 each. Supine PUP 0# x 12 on right. 0# x 15 reps with tactile cueing.  2 x 10 with

## 2018-01-17 ENCOUNTER — HOSPITAL ENCOUNTER (OUTPATIENT)
Dept: PHYSICAL THERAPY | Facility: HOSPITAL | Age: 68
Setting detail: THERAPIES SERIES
Discharge: HOME | End: 2018-01-17
Attending: FAMILY MEDICINE
Payer: MEDICARE

## 2018-01-17 PROCEDURE — 97110 THERAPEUTIC EXERCISES: CPT

## 2018-01-17 PROCEDURE — 97140 MANUAL THERAPY 1/> REGIONS: CPT

## 2018-01-17 NOTE — PROGRESS NOTES
Dx: Right arm pain; Acute pain right shoulder. Authorized # of Visits:  8         Next MD visit: none scheduled  Fall Risk: standard         Precautions: n/a             Subjective: 3-4/10 shoulder and right upper arm soreness/pain this afternoon. Consuelo Nair cueing. X 10 reps including cueing to activate dnf mm. . X 10 reps with cueing and some fatigue noted. Grade 2 right GH ap and caudal mobs by PT. X. Grade 3 right GH ap and caudal mobs by PT. Grade 3 right GH a/p and caudal mobs by PT.       PROM right

## 2018-01-22 ENCOUNTER — HOSPITAL ENCOUNTER (OUTPATIENT)
Dept: PHYSICAL THERAPY | Facility: HOSPITAL | Age: 68
Setting detail: THERAPIES SERIES
Discharge: HOME | End: 2018-01-22
Attending: FAMILY MEDICINE
Payer: MEDICARE

## 2018-01-22 PROCEDURE — 97140 MANUAL THERAPY 1/> REGIONS: CPT

## 2018-01-22 PROCEDURE — 97110 THERAPEUTIC EXERCISES: CPT

## 2018-01-22 NOTE — PROGRESS NOTES
Dx: Right arm pain; Acute pain right shoulder.          Authorized # of Visits:  8         Next MD visit: none scheduled  Fall Risk: standard         Precautions: n/a             Subjective: 3/10 right upper arm soreness/pain this afternoon, but was a 3/10 mm.. X 10 reps with cueing and some fatigue noted. Supine x 6 reps with cueing. Grade 2 right GH ap and caudal mobs by PT. X. Grade 3 right GH ap and caudal mobs by PT. Grade 3 right GH a/p and caudal mobs by PT.  Right GH a/p and caudal mobs by PT.

## 2018-01-24 ENCOUNTER — HOSPITAL ENCOUNTER (OUTPATIENT)
Dept: PHYSICAL THERAPY | Facility: HOSPITAL | Age: 68
Setting detail: THERAPIES SERIES
Discharge: HOME | End: 2018-01-24
Attending: FAMILY MEDICINE
Payer: MEDICARE

## 2018-01-24 PROCEDURE — 97110 THERAPEUTIC EXERCISES: CPT

## 2018-01-24 PROCEDURE — 97140 MANUAL THERAPY 1/> REGIONS: CPT

## 2018-01-24 NOTE — PROGRESS NOTES
Dx: Right arm pain; Acute pain right shoulder. Authorized # of Visits:  8         Next MD visit: none scheduled  Fall Risk: standard         Precautions: n/a             Subjective:  Notes feeling noticeably better overall.    States that she notes with cueing. X 6 reps with cueing. Grade 2 right GH ap and caudal mobs by PT. X. Grade 3 right GH ap and caudal mobs by PT. Grade 3 right GH a/p and caudal mobs by PT. Right GH a/p and caudal mobs by PT. Grade 3 right GH a/p and caudal mobs by PT.     [place]

## 2018-01-29 ENCOUNTER — APPOINTMENT (OUTPATIENT)
Dept: PHYSICAL THERAPY | Facility: HOSPITAL | Age: 68
End: 2018-01-29
Attending: FAMILY MEDICINE
Payer: MEDICARE

## 2018-01-31 ENCOUNTER — APPOINTMENT (OUTPATIENT)
Dept: PHYSICAL THERAPY | Facility: HOSPITAL | Age: 68
End: 2018-01-31
Attending: FAMILY MEDICINE
Payer: MEDICARE

## 2018-02-28 ENCOUNTER — APPOINTMENT (OUTPATIENT)
Dept: LAB | Age: 68
End: 2018-02-28
Attending: FAMILY MEDICINE
Payer: MEDICARE

## 2018-02-28 DIAGNOSIS — E11.9 TYPE 2 DIABETES MELLITUS WITHOUT COMPLICATION, WITHOUT LONG-TERM CURRENT USE OF INSULIN (HCC): ICD-10-CM

## 2018-02-28 LAB
ALBUMIN SERPL-MCNC: 3.8 G/DL (ref 3.5–4.8)
ALP LIVER SERPL-CCNC: 86 U/L (ref 55–142)
ALT SERPL-CCNC: 28 U/L (ref 14–54)
AST SERPL-CCNC: 15 U/L (ref 15–41)
BILIRUB SERPL-MCNC: 0.7 MG/DL (ref 0.1–2)
BUN BLD-MCNC: 15 MG/DL (ref 8–20)
CALCIUM BLD-MCNC: 9.4 MG/DL (ref 8.3–10.3)
CHLORIDE: 104 MMOL/L (ref 101–111)
CHOLEST SMN-MCNC: 229 MG/DL (ref ?–200)
CO2: 28 MMOL/L (ref 22–32)
CREAT BLD-MCNC: 0.59 MG/DL (ref 0.55–1.02)
EST. AVERAGE GLUCOSE BLD GHB EST-MCNC: 157 MG/DL (ref 68–126)
GLUCOSE BLD-MCNC: 145 MG/DL (ref 70–99)
HBA1C MFR BLD HPLC: 7.1 % (ref ?–5.7)
HDLC SERPL-MCNC: 61 MG/DL (ref 45–?)
HDLC SERPL: 3.75 {RATIO} (ref ?–4.44)
LDLC SERPL CALC-MCNC: 144 MG/DL (ref ?–130)
M PROTEIN MFR SERPL ELPH: 7.4 G/DL (ref 6.1–8.3)
NONHDLC SERPL-MCNC: 168 MG/DL (ref ?–130)
POTASSIUM SERPL-SCNC: 4.1 MMOL/L (ref 3.6–5.1)
SODIUM SERPL-SCNC: 140 MMOL/L (ref 136–144)
TRIGL SERPL-MCNC: 121 MG/DL (ref ?–150)
VLDLC SERPL CALC-MCNC: 24 MG/DL (ref 5–40)

## 2018-02-28 PROCEDURE — 83036 HEMOGLOBIN GLYCOSYLATED A1C: CPT

## 2018-02-28 PROCEDURE — 36415 COLL VENOUS BLD VENIPUNCTURE: CPT

## 2018-02-28 PROCEDURE — 80053 COMPREHEN METABOLIC PANEL: CPT

## 2018-02-28 PROCEDURE — 80061 LIPID PANEL: CPT

## 2018-03-01 ENCOUNTER — TELEPHONE (OUTPATIENT)
Dept: FAMILY MEDICINE CLINIC | Facility: CLINIC | Age: 68
End: 2018-03-01

## 2018-03-01 RX ORDER — BLOOD-GLUCOSE METER
1 KIT MISCELLANEOUS 2 TIMES DAILY
Qty: 1 KIT | Refills: 0 | Status: SHIPPED | OUTPATIENT
Start: 2018-03-01 | End: 2018-03-19 | Stop reason: ALTCHOICE

## 2018-03-01 NOTE — TELEPHONE ENCOUNTER
Pt calling saying she has to get written scripts and drive to [address] [place] [address] to get them filled she is looking to get refills on some meds. Her meter is not working and she only has 2 pills left of the other.  Please advise [phone]    SITagliptin

## 2018-03-01 NOTE — TELEPHONE ENCOUNTER
S/w pt on this. Per chart  authorized 90 plus 1 11/20 on her Januvia. Pt obtained her bottle while on phone with me and confirmed she does have refill remaining. She was not aware of this.   As for meter, says meter works fine but the lancing device that

## 2018-03-19 ENCOUNTER — LAB ENCOUNTER (OUTPATIENT)
Dept: LAB | Age: 68
End: 2018-03-19
Attending: FAMILY MEDICINE
Payer: MEDICARE

## 2018-03-19 ENCOUNTER — OFFICE VISIT (OUTPATIENT)
Dept: FAMILY MEDICINE CLINIC | Facility: CLINIC | Age: 68
End: 2018-03-19

## 2018-03-19 VITALS
TEMPERATURE: 97 F | HEIGHT: 59 IN | BODY MASS INDEX: 29.23 KG/M2 | RESPIRATION RATE: 16 BRPM | DIASTOLIC BLOOD PRESSURE: 68 MMHG | SYSTOLIC BLOOD PRESSURE: 120 MMHG | HEART RATE: 71 BPM | WEIGHT: 145 LBS

## 2018-03-19 DIAGNOSIS — E11.9 TYPE 2 DIABETES MELLITUS WITHOUT COMPLICATION, WITHOUT LONG-TERM CURRENT USE OF INSULIN (HCC): Primary | ICD-10-CM

## 2018-03-19 DIAGNOSIS — E11.9 DIABETES MELLITUS (HCC): Primary | ICD-10-CM

## 2018-03-19 DIAGNOSIS — R09.81 SINUS CONGESTION: ICD-10-CM

## 2018-03-19 DIAGNOSIS — R05.9 COUGH: ICD-10-CM

## 2018-03-19 DIAGNOSIS — E78.00 HYPERCHOLESTEREMIA: ICD-10-CM

## 2018-03-19 LAB
CREAT UR-SCNC: 124 MG/DL
MICROALBUMIN UR-MCNC: 0.64 MG/DL
MICROALBUMIN/CREAT 24H UR-RTO: 5.2 UG/MG (ref ?–30)

## 2018-03-19 PROCEDURE — 82043 UR ALBUMIN QUANTITATIVE: CPT

## 2018-03-19 PROCEDURE — 82570 ASSAY OF URINE CREATININE: CPT

## 2018-03-19 PROCEDURE — 99214 OFFICE O/P EST MOD 30 MIN: CPT | Performed by: FAMILY MEDICINE

## 2018-03-19 RX ORDER — ROSUVASTATIN CALCIUM 5 MG/1
5 TABLET, COATED ORAL DAILY
Qty: 90 TABLET | Refills: 1 | Status: SHIPPED | OUTPATIENT
Start: 2018-03-19 | End: 2018-09-17

## 2018-03-19 RX ORDER — PIOGLITAZONEHYDROCHLORIDE 15 MG/1
15 TABLET ORAL DAILY
Qty: 90 TABLET | Refills: 1 | Status: SHIPPED | OUTPATIENT
Start: 2018-03-19 | End: 2019-03-21

## 2018-03-19 RX ORDER — CEFUROXIME AXETIL 500 MG/1
500 TABLET ORAL 2 TIMES DAILY
Qty: 20 TABLET | Refills: 0 | Status: SHIPPED | OUTPATIENT
Start: 2018-03-19 | End: 2018-03-29

## 2018-03-19 RX ORDER — FLUTICASONE PROPIONATE 50 MCG
2 SPRAY, SUSPENSION (ML) NASAL DAILY
Qty: 1 BOTTLE | Refills: 1 | Status: SHIPPED | OUTPATIENT
Start: 2018-03-19 | End: 2019-03-14

## 2018-03-19 RX ORDER — FLUTICASONE PROPIONATE AND SALMETEROL 100; 50 UG/1; UG/1
1 POWDER RESPIRATORY (INHALATION) 2 TIMES DAILY
Qty: 1 EACH | Refills: 0 | COMMUNITY
Start: 2018-03-19 | End: 2018-04-23 | Stop reason: ALTCHOICE

## 2018-03-19 NOTE — PROGRESS NOTES
Magdaleno Ochoa is a 70year old female. cc diabetes, hyperlipidemia, cough congestion stuffiness. HPI:   Coming for follow-up of diabetes. She noticed that her sugar is higher since she is right now on Januvia.   Metformin could not tolerate because dinner sometimes       REVIEW OF SYSTEMS:   GENERAL HEALTH: feels well otherwise, no fever  SKIN: denies any unusual skin lesions or rashes  HEENT nasal and sinus congestion, runny nose no sore throat  Neck no neck pain  RESPIRATORY: denies shortness of br Alt 28 14 - 54 U/L   Bilirubin, Total 0.7 0.1 - 2.0 mg/dL   Total Protein 7.4 6.1 - 8.3 g/dL   Albumin 3.8 3.5 - 4.8 g/dL   Sodium 140 136 - 144 mmol/L   Potassium 4.1 3.6 - 5.1 mmol/L   Chloride 104 101 - 111 mmol/L   CO2 28.0 22.0 - 32.0 mmol/L   -LIPI issues and agrees to the plan. The patient is asked to return in June 2018 after blood work.

## 2018-03-19 NOTE — PATIENT INSTRUCTIONS
Start Actos 15 mg 1 tablet daily. ( Pioglitasone)   Continue current meds. Watch diet for fats and carbs. Stay active. Do fasting blood work before next visit in June. Start antibiotic 1  tablet twice a day.   Use inhaler 1 puff twice a day for 1 we

## 2018-04-23 ENCOUNTER — OFFICE VISIT (OUTPATIENT)
Dept: FAMILY MEDICINE CLINIC | Facility: CLINIC | Age: 68
End: 2018-04-23

## 2018-04-23 VITALS
DIASTOLIC BLOOD PRESSURE: 62 MMHG | RESPIRATION RATE: 16 BRPM | BODY MASS INDEX: 29.84 KG/M2 | HEART RATE: 69 BPM | WEIGHT: 148 LBS | TEMPERATURE: 99 F | SYSTOLIC BLOOD PRESSURE: 110 MMHG | HEIGHT: 59 IN

## 2018-04-23 DIAGNOSIS — M85.80 OSTEOPENIA, UNSPECIFIED LOCATION: ICD-10-CM

## 2018-04-23 DIAGNOSIS — R05.9 COUGH: ICD-10-CM

## 2018-04-23 DIAGNOSIS — E55.9 VITAMIN D DEFICIENCY: ICD-10-CM

## 2018-04-23 DIAGNOSIS — Z12.31 ENCOUNTER FOR SCREENING MAMMOGRAM FOR BREAST CANCER: ICD-10-CM

## 2018-04-23 DIAGNOSIS — E78.2 HYPERLIPIDEMIA, MIXED: ICD-10-CM

## 2018-04-23 DIAGNOSIS — E11.9 TYPE 2 DIABETES MELLITUS WITHOUT COMPLICATION, WITHOUT LONG-TERM CURRENT USE OF INSULIN (HCC): ICD-10-CM

## 2018-04-23 DIAGNOSIS — Z00.00 MEDICARE ANNUAL WELLNESS VISIT, SUBSEQUENT: Primary | ICD-10-CM

## 2018-04-23 PROCEDURE — 99214 OFFICE O/P EST MOD 30 MIN: CPT | Performed by: FAMILY MEDICINE

## 2018-04-23 PROCEDURE — G0444 DEPRESSION SCREEN ANNUAL: HCPCS | Performed by: FAMILY MEDICINE

## 2018-04-23 PROCEDURE — G0439 PPPS, SUBSEQ VISIT: HCPCS | Performed by: FAMILY MEDICINE

## 2018-04-23 RX ORDER — FLUTICASONE PROPIONATE AND SALMETEROL 500; 50 UG/1; UG/1
1 POWDER RESPIRATORY (INHALATION) 2 TIMES DAILY
Qty: 1 EACH | Refills: 0 | COMMUNITY
Start: 2018-04-23 | End: 2018-05-23

## 2018-04-23 NOTE — PATIENT INSTRUCTIONS
Shingrix - shingles shot. Start inhaler 1 puff twice a day. Coricidin -  it is decongestant okay to use. Saline spray as needed over the counter. Call [phone] to schedule Mammogram.  Do fasting blood work before next visit in June.   Continue cur between ages 73-68) IPPE only No results found for this or any previous visit.  Limited to patients who meet one of the following criteria:   • Men who are 73-68 years old and have smoked more than 100 cigarettes in their lifetime   • Anyone with a family h Annually to at least age 76, and as needed after 76 Mammogram,1 Yr due on 01/28/2017 Please get this Mammogram regularly   Immunizations      Influenza  Covered Annually No orders found for this or any previous visit.  Please get every year    Pneumococcal [place])  [email or web address]  This link also has information from the [address] [place] [address] regarding Advance Directives.

## 2018-04-23 NOTE — PROGRESS NOTES
HPI:   Clifford Foreman is a 70year old female who presents for a Medicare Subsequent Annual Wellness visit (Pt already had Initial Annual Wellness) so complaining of having cough, diabetic. Patient complains of having cough for 1 week.   She is b tobacco.  Ms. Marilee Talavera already takes aspirin and has it on her medication list.   CAGE Alcohol screening   Clyde Palomino was screened for Alcohol abuse and had a score of 0 so is at low risk.     Patient Care Team: Patient Care Team:  Yashira Smith Suspension 2 sprays by Nasal route daily. FREESTYLE LANCETS Does not apply Misc Test  3times daily   Glucose Blood (FREESTYLE LITE TEST) In Vitro Strip Test 3 times daily      MEDICAL INFORMATION:   She  has a past medical history of Diabetes ([address]     Kevan Krueger conjunctiva/corneas clear, EOM's intact both eyes   Ears:  Normal TM's and external ear canals, both ears   Nose: Nares normal, septum midline,mucosa normal, no drainage or sinus tenderness   Throat: Lips, mucosa, and tongue normal; teeth and gums normal VITAMIN D, 25-HYDROXY; Future    Cough  -     fluticasone-salmeterol (ADVAIR DISKUS) 500-50 MCG/DOSE Inhalation Aerosol Powder, Breath Activated; Inhale 1 puff into the lungs 2 (two) times daily.     Diabetes type 2 patient noticed to have improvement in he Interaction      This section provided for quick review of chart, separate sheet to patient  [address] Internal Lab or Procedure External Lab or Procedure   Diabetes Screening      HbgA1C   Annually   Lab Results  Comp (Prevnar)  Covered Once after 65 01/18/2016 Please get once after your 65th birthday    Pneumococcal 23 (Pneumovax)  Covered Once after 65 02/13/2017 Please get once after your 65th birthday    Hepatitis B for Moderate/High Risk No vaccine history found Me

## 2018-04-24 ENCOUNTER — TELEPHONE (OUTPATIENT)
Dept: FAMILY MEDICINE CLINIC | Facility: CLINIC | Age: 68
End: 2018-04-24

## 2018-04-24 NOTE — TELEPHONE ENCOUNTER
Pt completed one of our Med Rec Forms requesting for our office to obtain a copy of her Colonoscopy Result/Report from [facility]. [place], [place]. Request was faxed successfully; fax @ [phone], ph @ [phone].   Pt's Release wa

## 2018-06-04 NOTE — TELEPHONE ENCOUNTER
From: Erin Barajas  Sent: 6/1/2018 11:47 AM CDT  Subject: Medication Renewal Request    Cinda Bañuelos would like a refill of the following medications:      SITagliptin Phosphate 100 MG Oral Tab Liudmila Edge MD]    [facility]

## 2018-06-11 ENCOUNTER — OFFICE VISIT (OUTPATIENT)
Dept: FAMILY MEDICINE CLINIC | Facility: CLINIC | Age: 68
End: 2018-06-11

## 2018-06-11 VITALS
HEART RATE: 60 BPM | HEIGHT: 59 IN | TEMPERATURE: 98 F | WEIGHT: 150.25 LBS | SYSTOLIC BLOOD PRESSURE: 118 MMHG | BODY MASS INDEX: 30.29 KG/M2 | RESPIRATION RATE: 16 BRPM | DIASTOLIC BLOOD PRESSURE: 76 MMHG

## 2018-06-11 DIAGNOSIS — E11.9 TYPE 2 DIABETES MELLITUS WITHOUT COMPLICATION, WITHOUT LONG-TERM CURRENT USE OF INSULIN (HCC): Primary | ICD-10-CM

## 2018-06-11 DIAGNOSIS — E55.9 VITAMIN D DEFICIENCY: ICD-10-CM

## 2018-06-11 DIAGNOSIS — E78.2 HYPERLIPIDEMIA, MIXED: ICD-10-CM

## 2018-06-11 PROCEDURE — 99214 OFFICE O/P EST MOD 30 MIN: CPT | Performed by: FAMILY MEDICINE

## 2018-06-11 RX ORDER — LANCETS 28 GAUGE
EACH MISCELLANEOUS
Qty: 100 EACH | Refills: 2 | Status: SHIPPED | OUTPATIENT
Start: 2018-06-11 | End: 2019-03-21

## 2018-06-11 NOTE — PATIENT INSTRUCTIONS
Continue current meds. Watch diet for fats and carbs. Stay active. Return for fasting blood work. Further recommendation pending results.

## 2018-06-11 NOTE — PROGRESS NOTES
Magdaleno Ochoa is a 70year old female. cc diabetes, hyperlipidemia, D deficiency, carotid artery narrowing. HPI:   Coming for follow-up of diabetes. Patient is doing good. Her blood sugar is fluctuating completely between 110 and 130.    Metformin congestion,no  runny nose no sore throat  Neck no neck pain  RESPIRATORY: denies shortness of breath with exertion  CARDIOVASCULAR: denies chest pain on exertion  GI: denies abdominal pain and denies heartburn  NEURO: denies headaches  Psych normal mood. Stay active. Return for fasting blood work. We will check ultrasound of the carotids in the fall. Imaging & Consults:  None    The patient indicates understanding of these issues and agrees to the plan.   The patient is asked to return in fall 2018

## 2018-06-12 ENCOUNTER — APPOINTMENT (OUTPATIENT)
Dept: LAB | Age: 68
End: 2018-06-12
Attending: FAMILY MEDICINE
Payer: MEDICARE

## 2018-06-12 DIAGNOSIS — E78.2 HYPERLIPIDEMIA, MIXED: ICD-10-CM

## 2018-06-12 DIAGNOSIS — E11.9 TYPE 2 DIABETES MELLITUS WITHOUT COMPLICATION, WITHOUT LONG-TERM CURRENT USE OF INSULIN (HCC): ICD-10-CM

## 2018-06-12 DIAGNOSIS — E55.9 VITAMIN D DEFICIENCY: ICD-10-CM

## 2018-06-12 PROCEDURE — 36415 COLL VENOUS BLD VENIPUNCTURE: CPT

## 2018-06-12 PROCEDURE — 80061 LIPID PANEL: CPT

## 2018-06-12 PROCEDURE — 80053 COMPREHEN METABOLIC PANEL: CPT

## 2018-06-12 PROCEDURE — 83036 HEMOGLOBIN GLYCOSYLATED A1C: CPT

## 2018-06-12 PROCEDURE — 82043 UR ALBUMIN QUANTITATIVE: CPT

## 2018-06-12 PROCEDURE — 82306 VITAMIN D 25 HYDROXY: CPT

## 2018-06-12 PROCEDURE — 84443 ASSAY THYROID STIM HORMONE: CPT

## 2018-06-12 PROCEDURE — 82570 ASSAY OF URINE CREATININE: CPT

## 2018-06-14 ENCOUNTER — TELEPHONE (OUTPATIENT)
Dept: FAMILY MEDICINE CLINIC | Facility: CLINIC | Age: 68
End: 2018-06-14

## 2018-06-14 DIAGNOSIS — E11.9 TYPE 2 DIABETES MELLITUS WITHOUT COMPLICATION, WITHOUT LONG-TERM CURRENT USE OF INSULIN (HCC): Primary | ICD-10-CM

## 2018-06-14 DIAGNOSIS — E55.9 VITAMIN D DEFICIENCY: ICD-10-CM

## 2018-06-14 RX ORDER — ERGOCALCIFEROL 1.25 MG/1
50000 CAPSULE ORAL WEEKLY
Qty: 12 CAPSULE | Refills: 0 | Status: SHIPPED | OUTPATIENT
Start: 2018-06-14 | End: 2019-03-21 | Stop reason: ALTCHOICE

## 2018-06-14 RX ORDER — ERGOCALCIFEROL 1.25 MG/1
50000 CAPSULE ORAL WEEKLY
Qty: 12 CAPSULE | Refills: 0 | Status: CANCELLED | OUTPATIENT
Start: 2018-06-14

## 2018-09-12 ENCOUNTER — APPOINTMENT (OUTPATIENT)
Dept: LAB | Age: 68
End: 2018-09-12
Attending: FAMILY MEDICINE
Payer: MEDICARE

## 2018-09-12 DIAGNOSIS — E55.9 VITAMIN D DEFICIENCY: ICD-10-CM

## 2018-09-12 DIAGNOSIS — E11.9 TYPE 2 DIABETES MELLITUS WITHOUT COMPLICATION, WITHOUT LONG-TERM CURRENT USE OF INSULIN (HCC): ICD-10-CM

## 2018-09-12 LAB
ALBUMIN SERPL-MCNC: 3.7 G/DL (ref 3.5–4.8)
ALBUMIN/GLOB SERPL: 0.9 {RATIO} (ref 1–2)
ALP LIVER SERPL-CCNC: 96 U/L (ref 55–142)
ALT SERPL-CCNC: 29 U/L (ref 14–54)
ANION GAP SERPL CALC-SCNC: 6 MMOL/L (ref 0–18)
AST SERPL-CCNC: 16 U/L (ref 15–41)
BILIRUB SERPL-MCNC: 0.5 MG/DL (ref 0.1–2)
BUN BLD-MCNC: 17 MG/DL (ref 8–20)
BUN/CREAT SERPL: 22.7 (ref 10–20)
CALCIUM BLD-MCNC: 9.8 MG/DL (ref 8.3–10.3)
CHLORIDE SERPL-SCNC: 105 MMOL/L (ref 101–111)
CHOLEST SMN-MCNC: 184 MG/DL (ref ?–200)
CO2 SERPL-SCNC: 28 MMOL/L (ref 22–32)
CREAT BLD-MCNC: 0.75 MG/DL (ref 0.55–1.02)
CREAT UR-SCNC: 124 MG/DL
EST. AVERAGE GLUCOSE BLD GHB EST-MCNC: 160 MG/DL (ref 68–126)
GLOBULIN PLAS-MCNC: 4.2 G/DL (ref 2.5–4)
GLUCOSE BLD-MCNC: 142 MG/DL (ref 70–99)
HBA1C MFR BLD HPLC: 7.2 % (ref ?–5.7)
HDLC SERPL-MCNC: 70 MG/DL (ref 40–59)
LDLC SERPL CALC-MCNC: 91 MG/DL (ref ?–100)
M PROTEIN MFR SERPL ELPH: 7.9 G/DL (ref 6.1–8.3)
MICROALBUMIN UR-MCNC: 0.64 MG/DL
MICROALBUMIN/CREAT 24H UR-RTO: 5.2 UG/MG (ref ?–30)
NONHDLC SERPL-MCNC: 114 MG/DL (ref ?–130)
OSMOLALITY SERPL CALC.SUM OF ELEC: 292 MOSM/KG (ref 275–295)
POTASSIUM SERPL-SCNC: 4.4 MMOL/L (ref 3.6–5.1)
SODIUM SERPL-SCNC: 139 MMOL/L (ref 136–144)
TRIGL SERPL-MCNC: 114 MG/DL (ref 30–149)
VIT D+METAB SERPL-MCNC: 27.9 NG/ML (ref 30–100)
VLDLC SERPL CALC-MCNC: 23 MG/DL (ref 0–30)

## 2018-09-12 PROCEDURE — 83036 HEMOGLOBIN GLYCOSYLATED A1C: CPT

## 2018-09-12 PROCEDURE — 82043 UR ALBUMIN QUANTITATIVE: CPT

## 2018-09-12 PROCEDURE — 82570 ASSAY OF URINE CREATININE: CPT

## 2018-09-12 PROCEDURE — 36415 COLL VENOUS BLD VENIPUNCTURE: CPT

## 2018-09-12 PROCEDURE — 80061 LIPID PANEL: CPT

## 2018-09-12 PROCEDURE — 80053 COMPREHEN METABOLIC PANEL: CPT

## 2018-09-12 PROCEDURE — 82306 VITAMIN D 25 HYDROXY: CPT

## 2018-09-17 ENCOUNTER — OFFICE VISIT (OUTPATIENT)
Dept: FAMILY MEDICINE CLINIC | Facility: CLINIC | Age: 68
End: 2018-09-17
Payer: MEDICARE

## 2018-09-17 VITALS
HEIGHT: 59 IN | DIASTOLIC BLOOD PRESSURE: 64 MMHG | HEART RATE: 76 BPM | TEMPERATURE: 97 F | SYSTOLIC BLOOD PRESSURE: 100 MMHG | BODY MASS INDEX: 31.1 KG/M2 | WEIGHT: 154.25 LBS | RESPIRATION RATE: 16 BRPM

## 2018-09-17 DIAGNOSIS — E55.9 VITAMIN D DEFICIENCY: ICD-10-CM

## 2018-09-17 DIAGNOSIS — M85.80 OSTEOPENIA, UNSPECIFIED LOCATION: ICD-10-CM

## 2018-09-17 DIAGNOSIS — E11.59 TYPE 2 DIABETES MELLITUS WITH OTHER CIRCULATORY COMPLICATIONS (HCC): Primary | ICD-10-CM

## 2018-09-17 DIAGNOSIS — E78.2 HYPERLIPIDEMIA, MIXED: ICD-10-CM

## 2018-09-17 DIAGNOSIS — R25.2 LEG CRAMPS: ICD-10-CM

## 2018-09-17 DIAGNOSIS — I65.22 ASYMPTOMATIC CAROTID ARTERY NARROWING WITHOUT INFARCTION, LEFT: ICD-10-CM

## 2018-09-17 PROCEDURE — G0008 ADMIN INFLUENZA VIRUS VAC: HCPCS | Performed by: FAMILY MEDICINE

## 2018-09-17 PROCEDURE — 99214 OFFICE O/P EST MOD 30 MIN: CPT | Performed by: FAMILY MEDICINE

## 2018-09-17 PROCEDURE — 90653 IIV ADJUVANT VACCINE IM: CPT | Performed by: FAMILY MEDICINE

## 2018-09-17 RX ORDER — ROSUVASTATIN CALCIUM 5 MG/1
5 TABLET, COATED ORAL DAILY
Qty: 90 TABLET | Refills: 1 | Status: SHIPPED | OUTPATIENT
Start: 2018-09-17 | End: 2019-03-21

## 2018-09-17 RX ORDER — PIOGLITAZONEHYDROCHLORIDE 30 MG/1
30 TABLET ORAL DAILY
Qty: 90 TABLET | Refills: 1 | Status: SHIPPED | OUTPATIENT
Start: 2018-09-17 | End: 2019-03-21

## 2018-09-17 RX ORDER — PIOGLITAZONEHYDROCHLORIDE 15 MG/1
15 TABLET ORAL DAILY
Qty: 90 TABLET | Refills: 1 | Status: CANCELLED | OUTPATIENT
Start: 2018-09-17

## 2018-09-17 RX ORDER — ERGOCALCIFEROL 1.25 MG/1
50000 CAPSULE ORAL
Qty: 12 CAPSULE | Refills: 0 | Status: SHIPPED | OUTPATIENT
Start: 2018-09-17 | End: 2018-10-17

## 2018-09-17 NOTE — PROGRESS NOTES
Mirtha Cordova is a 70year old female. cc diabetes, hyperlipidemia, D deficiency, carotid artery narrowing. HPI:   Coming for follow-up of diabetes. Patient is doing good. Her blood sugar is fluctuating completely between 110 and 130.    Metformin not apply Misc Test  3times daily Disp: 100 each Rfl: 2   Glucose Blood In Vitro Strip Test 3 times daily Disp: 100 each Rfl: 2   aspirin 81 MG Oral Tab Take 81 mg by mouth daily.  Disp:  Rfl:    Pioglitazone HCl 15 MG Oral Tab Take 1 tablet (15 mg total) b monofilament/sensation of both feet is normal.  Pulsation pedal pulse exam of both lower legs/feet is normal as well.   Psychiatric - alert  and oriented x3, normal mood     Results for orders placed or performed in visit on 09/12/18   LIPID PANEL   Result A1C      VITAMIN D, 25-HYDROXY      COMP METABOLIC PANEL      CBC W DIFFERENTIAL W PLATELET      LIPID PANEL      UA/M With Culture Reflex [E]      Magnesium [E]      CK (Creatine Kinase) (Not Creatinine) (E)      CK (Creatine Kinase) (Not Creatinine) (E)

## 2018-09-17 NOTE — PATIENT INSTRUCTIONS
Increase Pioglitazone to 30 mg daily. Continue current medications. Call [phone] to schedule ankle brachial index and ultrasound of the carotids in October. Do fasting blood work one week prior next visit in January 2019.     Follow-up in office aft

## 2018-09-18 ENCOUNTER — TELEPHONE (OUTPATIENT)
Dept: FAMILY MEDICINE CLINIC | Facility: CLINIC | Age: 68
End: 2018-09-18

## 2018-09-18 NOTE — TELEPHONE ENCOUNTER
Pt was here yesterday and took home written scripts b/c she manually brings scripts to [facility].   Pt states she did not get the written script for ergocalciferol 72165 units and would like to pick it up today b/c she is going to pharmacy tomorro

## 2018-09-18 NOTE — TELEPHONE ENCOUNTER
Looked at some of the paperwork on RICARDA Estes's desk did not see hard copy of Vit D script. Will check with Franklyn tomorrow.

## 2018-09-20 RX ORDER — ERGOCALCIFEROL 1.25 MG/1
50000 CAPSULE ORAL
Qty: 12 CAPSULE | Refills: 0 | OUTPATIENT
Start: 2018-09-20 | End: 2018-10-20

## 2018-10-24 ENCOUNTER — HOSPITAL ENCOUNTER (OUTPATIENT)
Dept: ULTRASOUND IMAGING | Facility: HOSPITAL | Age: 68
Discharge: HOME | End: 2018-10-24
Attending: FAMILY MEDICINE
Payer: MEDICARE

## 2018-10-24 DIAGNOSIS — E11.59 TYPE 2 DIABETES MELLITUS WITH OTHER CIRCULATORY COMPLICATIONS (HCC): ICD-10-CM

## 2018-10-24 DIAGNOSIS — I65.22 ASYMPTOMATIC CAROTID ARTERY NARROWING WITHOUT INFARCTION, LEFT: ICD-10-CM

## 2018-10-24 DIAGNOSIS — R25.2 LEG CRAMPS: ICD-10-CM

## 2018-10-24 PROCEDURE — 93922 UPR/L XTREMITY ART 2 LEVELS: CPT | Performed by: FAMILY MEDICINE

## 2018-10-24 PROCEDURE — 93880 EXTRACRANIAL BILAT STUDY: CPT | Performed by: FAMILY MEDICINE

## 2019-03-14 ENCOUNTER — LAB ENCOUNTER (OUTPATIENT)
Dept: LAB | Age: 69
End: 2019-03-14
Attending: FAMILY MEDICINE
Payer: MEDICARE

## 2019-03-14 DIAGNOSIS — E55.9 VITAMIN D DEFICIENCY: ICD-10-CM

## 2019-03-14 DIAGNOSIS — E78.2 HYPERLIPIDEMIA, MIXED: ICD-10-CM

## 2019-03-14 DIAGNOSIS — R25.2 LEG CRAMPS: ICD-10-CM

## 2019-03-14 DIAGNOSIS — E11.59 TYPE 2 DIABETES MELLITUS WITH OTHER CIRCULATORY COMPLICATIONS (HCC): ICD-10-CM

## 2019-03-14 LAB
ALBUMIN SERPL-MCNC: 3.5 G/DL (ref 3.4–5)
ALBUMIN/GLOB SERPL: 1 {RATIO} (ref 1–2)
ALP LIVER SERPL-CCNC: 74 U/L (ref 55–142)
ALT SERPL-CCNC: 24 U/L (ref 13–56)
ANION GAP SERPL CALC-SCNC: 5 MMOL/L (ref 0–18)
AST SERPL-CCNC: 13 U/L (ref 15–37)
BASOPHILS # BLD AUTO: 0.06 X10(3) UL (ref 0–0.2)
BASOPHILS NFR BLD AUTO: 0.9 %
BILIRUB SERPL-MCNC: 0.4 MG/DL (ref 0.1–2)
BILIRUB UR QL STRIP.AUTO: NEGATIVE
BUN BLD-MCNC: 19 MG/DL (ref 7–18)
BUN/CREAT SERPL: 32.2 (ref 10–20)
CALCIUM BLD-MCNC: 9 MG/DL (ref 8.5–10.1)
CHLORIDE SERPL-SCNC: 107 MMOL/L (ref 98–107)
CHOLEST SMN-MCNC: 153 MG/DL (ref ?–200)
CK SERPL-CCNC: 54 U/L (ref 26–192)
CLARITY UR REFRACT.AUTO: CLEAR
CO2 SERPL-SCNC: 28 MMOL/L (ref 21–32)
COLOR UR AUTO: YELLOW
CREAT BLD-MCNC: 0.59 MG/DL (ref 0.55–1.02)
DEPRECATED RDW RBC AUTO: 48.6 FL (ref 35.1–46.3)
EOSINOPHIL # BLD AUTO: 0.22 X10(3) UL (ref 0–0.7)
EOSINOPHIL NFR BLD AUTO: 3.2 %
ERYTHROCYTE [DISTWIDTH] IN BLOOD BY AUTOMATED COUNT: 14.6 % (ref 11–15)
EST. AVERAGE GLUCOSE BLD GHB EST-MCNC: 154 MG/DL (ref 68–126)
GLOBULIN PLAS-MCNC: 3.6 G/DL (ref 2.8–4.4)
GLUCOSE BLD-MCNC: 133 MG/DL (ref 70–99)
GLUCOSE UR STRIP.AUTO-MCNC: NEGATIVE MG/DL
HAV IGM SER QL: 2.4 MG/DL (ref 1.6–2.6)
HBA1C MFR BLD HPLC: 7 % (ref ?–5.7)
HCT VFR BLD AUTO: 41.3 % (ref 35–48)
HDLC SERPL-MCNC: 64 MG/DL (ref 40–59)
HGB BLD-MCNC: 13.5 G/DL (ref 12–16)
IMM GRANULOCYTES # BLD AUTO: 0.03 X10(3) UL (ref 0–1)
IMM GRANULOCYTES NFR BLD: 0.4 %
KETONES UR STRIP.AUTO-MCNC: NEGATIVE MG/DL
LDLC SERPL CALC-MCNC: 70 MG/DL (ref ?–100)
LYMPHOCYTES # BLD AUTO: 1.98 X10(3) UL (ref 1–4)
LYMPHOCYTES NFR BLD AUTO: 29.2 %
M PROTEIN MFR SERPL ELPH: 7.1 G/DL (ref 6.4–8.2)
MCH RBC QN AUTO: 29.7 PG (ref 26–34)
MCHC RBC AUTO-ENTMCNC: 32.7 G/DL (ref 31–37)
MCV RBC AUTO: 90.8 FL (ref 80–100)
MONOCYTES # BLD AUTO: 0.57 X10(3) UL (ref 0.1–1)
MONOCYTES NFR BLD AUTO: 8.4 %
NEUTROPHILS # BLD AUTO: 3.91 X10 (3) UL (ref 1.5–7.7)
NEUTROPHILS # BLD AUTO: 3.91 X10(3) UL (ref 1.5–7.7)
NEUTROPHILS NFR BLD AUTO: 57.9 %
NITRITE UR QL STRIP.AUTO: NEGATIVE
NONHDLC SERPL-MCNC: 89 MG/DL (ref ?–130)
OSMOLALITY SERPL CALC.SUM OF ELEC: 294 MOSM/KG (ref 275–295)
PH UR STRIP.AUTO: 7 [PH] (ref 4.5–8)
PLATELET # BLD AUTO: 287 10(3)UL (ref 150–450)
POTASSIUM SERPL-SCNC: 4.3 MMOL/L (ref 3.5–5.1)
PROT UR STRIP.AUTO-MCNC: NEGATIVE MG/DL
RBC # BLD AUTO: 4.55 X10(6)UL (ref 3.8–5.3)
RBC UR QL AUTO: NEGATIVE
SODIUM SERPL-SCNC: 140 MMOL/L (ref 136–145)
SP GR UR STRIP.AUTO: 1.02 (ref 1–1.03)
TRIGL SERPL-MCNC: 97 MG/DL (ref 30–149)
UROBILINOGEN UR STRIP.AUTO-MCNC: <2 MG/DL
VIT D+METAB SERPL-MCNC: 45 NG/ML (ref 30–100)
VLDLC SERPL CALC-MCNC: 19 MG/DL (ref 0–30)
WBC # BLD AUTO: 6.8 X10(3) UL (ref 4–11)

## 2019-03-14 PROCEDURE — 81001 URINALYSIS AUTO W/SCOPE: CPT

## 2019-03-14 PROCEDURE — 85025 COMPLETE CBC W/AUTO DIFF WBC: CPT

## 2019-03-14 PROCEDURE — 87086 URINE CULTURE/COLONY COUNT: CPT

## 2019-03-14 PROCEDURE — 82306 VITAMIN D 25 HYDROXY: CPT

## 2019-03-14 PROCEDURE — 82550 ASSAY OF CK (CPK): CPT

## 2019-03-14 PROCEDURE — 80053 COMPREHEN METABOLIC PANEL: CPT

## 2019-03-14 PROCEDURE — 36415 COLL VENOUS BLD VENIPUNCTURE: CPT

## 2019-03-14 PROCEDURE — 80061 LIPID PANEL: CPT

## 2019-03-14 PROCEDURE — 83036 HEMOGLOBIN GLYCOSYLATED A1C: CPT

## 2019-03-14 PROCEDURE — 83735 ASSAY OF MAGNESIUM: CPT

## 2019-03-21 ENCOUNTER — APPOINTMENT (OUTPATIENT)
Dept: LAB | Age: 69
End: 2019-03-21
Attending: FAMILY MEDICINE
Payer: MEDICARE

## 2019-03-21 ENCOUNTER — OFFICE VISIT (OUTPATIENT)
Dept: FAMILY MEDICINE CLINIC | Facility: CLINIC | Age: 69
End: 2019-03-21
Payer: MEDICARE

## 2019-03-21 VITALS
HEIGHT: 59 IN | WEIGHT: 154 LBS | DIASTOLIC BLOOD PRESSURE: 78 MMHG | TEMPERATURE: 97 F | BODY MASS INDEX: 31.04 KG/M2 | HEART RATE: 61 BPM | RESPIRATION RATE: 16 BRPM | SYSTOLIC BLOOD PRESSURE: 108 MMHG

## 2019-03-21 DIAGNOSIS — E55.9 VITAMIN D DEFICIENCY: ICD-10-CM

## 2019-03-21 DIAGNOSIS — E66.9 OBESITY (BMI 30.0-34.9): ICD-10-CM

## 2019-03-21 DIAGNOSIS — E11.9 TYPE 2 DIABETES MELLITUS WITHOUT COMPLICATION, WITHOUT LONG-TERM CURRENT USE OF INSULIN (HCC): Primary | ICD-10-CM

## 2019-03-21 DIAGNOSIS — E78.2 HYPERLIPIDEMIA, MIXED: ICD-10-CM

## 2019-03-21 DIAGNOSIS — E11.9 TYPE 2 DIABETES MELLITUS WITHOUT COMPLICATION, WITHOUT LONG-TERM CURRENT USE OF INSULIN (HCC): ICD-10-CM

## 2019-03-21 DIAGNOSIS — I65.22 LEFT CAROTID STENOSIS: ICD-10-CM

## 2019-03-21 LAB
CREAT UR-SCNC: 43.5 MG/DL
MICROALBUMIN UR-MCNC: <0.5 MG/DL

## 2019-03-21 PROCEDURE — 99214 OFFICE O/P EST MOD 30 MIN: CPT | Performed by: FAMILY MEDICINE

## 2019-03-21 PROCEDURE — 82570 ASSAY OF URINE CREATININE: CPT

## 2019-03-21 PROCEDURE — 82043 UR ALBUMIN QUANTITATIVE: CPT

## 2019-03-21 RX ORDER — ROSUVASTATIN CALCIUM 5 MG/1
5 TABLET, COATED ORAL DAILY
Qty: 90 TABLET | Refills: 1 | Status: SHIPPED | OUTPATIENT
Start: 2019-03-21 | End: 2019-11-08

## 2019-03-21 RX ORDER — LANCETS 28 GAUGE
EACH MISCELLANEOUS
Qty: 100 EACH | Refills: 2 | Status: SHIPPED | OUTPATIENT
Start: 2019-03-21

## 2019-03-21 RX ORDER — PIOGLITAZONEHYDROCHLORIDE 30 MG/1
30 TABLET ORAL DAILY
Qty: 90 TABLET | Refills: 1 | Status: SHIPPED | OUTPATIENT
Start: 2019-03-21 | End: 2019-11-08

## 2019-03-21 RX ORDER — PIOGLITAZONEHYDROCHLORIDE 15 MG/1
15 TABLET ORAL DAILY
Qty: 90 TABLET | Refills: 1 | Status: CANCELLED | OUTPATIENT
Start: 2019-03-21

## 2019-03-21 RX ORDER — ROSUVASTATIN CALCIUM 5 MG/1
5 TABLET, COATED ORAL DAILY
Qty: 90 TABLET | Refills: 1 | OUTPATIENT
Start: 2019-03-21 | End: 2019-03-21

## 2019-03-21 NOTE — PROGRESS NOTES
Lansing Leventhal is a 78year old female. cc diabetes, hyperlipidemia, D deficiency, carotid artery narrowing. HPI:   Coming for follow-up of diabetes. Patient is doing good. Her blood sugar is fluctuating completely between 110 and 130.    Metformin and sinus congestion,no  runny nose no sore throat  Neck no neck pain  RESPIRATORY: denies shortness of breath with exertion  CARDIOVASCULAR: denies chest pain on exertion  GI: denies abdominal pain and denies heartburn  NEURO: denies headaches  Psych norm mOsm/kg    GFR, Non- 93 >=60    GFR, -American 107 >=60    AST 13 (L) 15 - 37 U/L    ALT 24 13 - 56 U/L    Alkaline Phosphatase 74 55 - 142 U/L    Bilirubin, Total 0.4 0.1 - 2.0 mg/dL    Total Protein 7.1 6.4 - 8.2 g/dL    Albumin 3. 31.0 - 37.0 g/dL    RDW 14.6 11.0 - 15.0 %    RDW-SD 48.6 (H) 35.1 - 46.3 fL    Neutrophil Absolute Prelim 3.91 1.50 - 7.70 x10 (3) uL    Neutrophil Absolute 3.91 1.50 - 7.70 x10(3) uL    Lymphocyte Absolute 1.98 1.00 - 4.00 x10(3) uL    Monocyte Absolute

## 2019-03-21 NOTE — PATIENT INSTRUCTIONS
Continue current meds. Watch diet for fats and carbs. Stay active. Blood sugar goal for the morning fasting is less than 130.  2 hours after food your blood sugar goal is less than 160.

## 2019-05-22 ENCOUNTER — OFFICE VISIT (OUTPATIENT)
Dept: FAMILY MEDICINE CLINIC | Facility: CLINIC | Age: 69
End: 2019-05-22
Payer: MEDICARE

## 2019-05-22 VITALS
SYSTOLIC BLOOD PRESSURE: 114 MMHG | HEART RATE: 69 BPM | BODY MASS INDEX: 31.45 KG/M2 | RESPIRATION RATE: 16 BRPM | TEMPERATURE: 98 F | OXYGEN SATURATION: 99 % | WEIGHT: 156 LBS | HEIGHT: 58.86 IN | DIASTOLIC BLOOD PRESSURE: 76 MMHG

## 2019-05-22 DIAGNOSIS — Z78.0 POSTMENOPAUSAL: ICD-10-CM

## 2019-05-22 DIAGNOSIS — Z00.00 ENCOUNTER FOR ANNUAL HEALTH EXAMINATION: Primary | ICD-10-CM

## 2019-05-22 DIAGNOSIS — E11.9 TYPE 2 DIABETES MELLITUS WITHOUT COMPLICATION, WITHOUT LONG-TERM CURRENT USE OF INSULIN (HCC): ICD-10-CM

## 2019-05-22 DIAGNOSIS — M85.80 OSTEOPENIA, UNSPECIFIED LOCATION: ICD-10-CM

## 2019-05-22 DIAGNOSIS — Z12.31 ENCOUNTER FOR SCREENING MAMMOGRAM FOR BREAST CANCER: ICD-10-CM

## 2019-05-22 DIAGNOSIS — E78.2 HYPERLIPIDEMIA, MIXED: ICD-10-CM

## 2019-05-22 PROCEDURE — 99213 OFFICE O/P EST LOW 20 MIN: CPT | Performed by: FAMILY MEDICINE

## 2019-05-22 PROCEDURE — G0444 DEPRESSION SCREEN ANNUAL: HCPCS | Performed by: FAMILY MEDICINE

## 2019-05-22 PROCEDURE — G0439 PPPS, SUBSEQ VISIT: HCPCS | Performed by: FAMILY MEDICINE

## 2019-05-22 NOTE — PROGRESS NOTES
HPI:   Adrian Hackett is a 78year old female who presents for a Medicare Subsequent Annual Wellness visit (Pt already had Initial Annual Wellness) also   patient is diabetic, postmenopausal, hyperlipidemia       postmenopausal patient has osteopen She does NOT have a Power of  for [employer] on file in [facility] The patient has this document but we do not have it in [facility], and patient is instructed to get our office a copy of it for scanning into Epic.          She has never smoked tobacco. SITagliptin Phosphate 100 MG Oral Tab Take 1 tablet (100 mg total) by mouth daily. Rosuvastatin Calcium 5 MG Oral Tab Take 1 tablet (5 mg total) by mouth daily. aspirin 81 MG Oral Tab Take 81 mg by mouth daily.       MEDICAL INFORMATION:   She  has a abnormality, atraumatic   Eyes:  PERRL, conjunctiva/corneas clear, EOM's intact both eyes   Ears:  Normal TM's and external ear canals, both ears   Nose: Nares normal, septum midline,mucosa normal, no drainage or sinus tenderness   Throat: Lips, mucosa, an Future    Postmenopausal  -     XR DEXA BONE DENSITY AXIAL (CPT=77080);  Future    Type 2 diabetes mellitus without complication, without long-term current use of insulin (HCC)  -     OPHTHALMOLOGY - INTERNAL  -     HEMOGLOBIN A1C; Future  -     MICROALB/CR to patient  PREVENTATIVE SERVICES  INDICATIONS AND SCHEDULE Internal Lab or Procedure External Lab or Procedure   Diabetes Screening      HbgA1C   Annually Lab Results   Component Value Date    A1C 7.0 (H) 03/14/2019    No flowsheet data found.     Fasting Once after 65 02/13/2017 Please get once after your 65th birthday    Hepatitis B for Moderate/High Risk No vaccine history found Medium/high risk factors:   End-stage renal disease   Hemophiliacs who received Factor VIII or IX concentrates   Clients of ins

## 2019-05-22 NOTE — PATIENT INSTRUCTIONS
Call [phone] to schedule Mammogram and Dexa scan. Healthy diet. Stay active. Shingrix - shingles shot. Call eye doctor for evaluation Dr. Lyric David. Do blood work fasting end of June of this year.     Massiel Anders's SCREENING SCHEDULE Men who are 73-68 years old and have smoked more than 100 cigarettes in their lifetime   • Anyone with a family history    Colorectal Cancer Screening  Covered up to Age 76     Colonoscopy Screen   Covered every 10 years- more often if abnormal Colonoscopy Annually No orders found for this or any previous visit.  Please get every year    Pneumococcal 13 (Prevnar)  Covered Once after 65 Orders placed or performed in visit on 01/18/16   • PNEUMOCOCCAL VACC, 13 LOGAN IM    Please get once after your 65th birthday

## 2019-05-29 ENCOUNTER — HOSPITAL ENCOUNTER (OUTPATIENT)
Dept: BONE DENSITY | Age: 69
Discharge: HOME | End: 2019-05-29
Attending: FAMILY MEDICINE
Payer: MEDICARE

## 2019-05-29 ENCOUNTER — HOSPITAL ENCOUNTER (OUTPATIENT)
Dept: MAMMOGRAPHY | Age: 69
Discharge: HOME | End: 2019-05-29
Attending: FAMILY MEDICINE
Payer: MEDICARE

## 2019-05-29 DIAGNOSIS — Z12.31 ENCOUNTER FOR SCREENING MAMMOGRAM FOR BREAST CANCER: ICD-10-CM

## 2019-05-29 DIAGNOSIS — Z78.0 POSTMENOPAUSAL: ICD-10-CM

## 2019-05-29 PROCEDURE — 77080 DXA BONE DENSITY AXIAL: CPT | Performed by: FAMILY MEDICINE

## 2019-05-29 PROCEDURE — 77063 BREAST TOMOSYNTHESIS BI: CPT | Performed by: FAMILY MEDICINE

## 2019-05-29 PROCEDURE — 77067 SCR MAMMO BI INCL CAD: CPT | Performed by: FAMILY MEDICINE

## 2019-06-03 ENCOUNTER — OFFICE VISIT (OUTPATIENT)
Dept: FAMILY MEDICINE CLINIC | Facility: CLINIC | Age: 69
End: 2019-06-03
Payer: MEDICARE

## 2019-06-03 VITALS
BODY MASS INDEX: 31.04 KG/M2 | HEART RATE: 74 BPM | HEIGHT: 59 IN | DIASTOLIC BLOOD PRESSURE: 64 MMHG | TEMPERATURE: 98 F | RESPIRATION RATE: 14 BRPM | WEIGHT: 154 LBS | SYSTOLIC BLOOD PRESSURE: 104 MMHG

## 2019-06-03 DIAGNOSIS — M85.80 OSTEOPENIA, UNSPECIFIED LOCATION: Primary | ICD-10-CM

## 2019-06-03 PROCEDURE — 99213 OFFICE O/P EST LOW 20 MIN: CPT | Performed by: FAMILY MEDICINE

## 2019-06-03 RX ORDER — ALENDRONATE SODIUM 35 MG/1
35 TABLET ORAL
Qty: 12 TABLET | Refills: 1 | Status: SHIPPED | OUTPATIENT
Start: 2019-06-03 | End: 2020-06-12 | Stop reason: ALTCHOICE

## 2019-06-03 NOTE — PROGRESS NOTES
Vinicius Barrientos is a 78year old female. cc discussed DEXA scan results  HPI:   Patient is coming to discuss her DEXA scan results which showed osteopenia with T score -2.3 of the femoral neck T score -1.0 at the hip and normal lumbar spine.   Patient i lesions  HEENT: atraumatic, normocephalic,  NECK: supple,no adenopathy,  LUNGS: clear to auscultation  CARDIO: RRR without murmur  EXTREMITIES: no edema  Psychiatric - alert  and oriented x3, normal mood     PROCEDURE:  XR DEXA BONE DENSITOMETRY (CPT=13600 between -1 and -2.5  Osteoporosis:  T-score less than -2.5           Dictated by: Kirstin Gee MD on 5/29/2019 at 8:57       Approved by: Kirstin Gee MD              ASSESSMENT AND PLAN:     Osteopenia, unspecified location  (primary encounter diagnosis

## 2019-06-03 NOTE — PATIENT INSTRUCTIONS
Start alendronate 5 mg 1 tablet once a week on empty stomach with full glass of water stay upright for 30 to 45 minutes after taking this medication then he can have breakfast.  Take calcium 1000 mg daily with vitamin D3 1000 units daily.   Keep good hydrat

## 2019-06-13 ENCOUNTER — HOSPITAL ENCOUNTER (OUTPATIENT)
Dept: ULTRASOUND IMAGING | Age: 69
Discharge: HOME | End: 2019-06-13
Attending: FAMILY MEDICINE
Payer: MEDICARE

## 2019-06-13 ENCOUNTER — HOSPITAL ENCOUNTER (OUTPATIENT)
Dept: MAMMOGRAPHY | Age: 69
Discharge: HOME | End: 2019-06-13
Attending: FAMILY MEDICINE
Payer: MEDICARE

## 2019-06-13 DIAGNOSIS — R92.8 ABNORMAL MAMMOGRAM: Primary | ICD-10-CM

## 2019-06-13 DIAGNOSIS — R92.2 INCONCLUSIVE MAMMOGRAM: ICD-10-CM

## 2019-06-13 PROCEDURE — 76642 ULTRASOUND BREAST LIMITED: CPT | Performed by: FAMILY MEDICINE

## 2019-06-13 PROCEDURE — 77066 DX MAMMO INCL CAD BI: CPT | Performed by: FAMILY MEDICINE

## 2019-06-13 PROCEDURE — 77062 BREAST TOMOSYNTHESIS BI: CPT | Performed by: FAMILY MEDICINE

## 2019-08-13 ENCOUNTER — MED REC SCAN ONLY (OUTPATIENT)
Dept: FAMILY MEDICINE CLINIC | Facility: CLINIC | Age: 69
End: 2019-08-13

## 2019-11-08 NOTE — TELEPHONE ENCOUNTER
SITagliptin Phosphate 100 MG Oral Tab    Pioglitazone HCl 30 MG Oral Tab    Pt failed protocol due to her most recent   Diabetic labs. Specifically her Hemoglobin A1c. Which was done on 03/14/2019. Her A1c value was 7.0. She was advised to repeat labs.

## 2019-11-08 NOTE — TELEPHONE ENCOUNTER
When asked if pt requesting through pharmacy, pt states she prefers to  scripts from our office and take it to the [facility] because it hasn't worked electronically in the past.     Pt requesting rx-  Rosuvastatin Calcium 5 MG Oral Tab

## 2019-11-11 RX ORDER — PIOGLITAZONEHYDROCHLORIDE 30 MG/1
30 TABLET ORAL DAILY
Qty: 90 TABLET | Refills: 0 | Status: SHIPPED | OUTPATIENT
Start: 2019-11-11 | End: 2019-12-12

## 2019-11-11 RX ORDER — ROSUVASTATIN CALCIUM 5 MG/1
5 TABLET, COATED ORAL DAILY
Qty: 90 TABLET | Refills: 0 | Status: SHIPPED | OUTPATIENT
Start: 2019-11-11 | End: 2019-12-12

## 2019-12-10 ENCOUNTER — APPOINTMENT (OUTPATIENT)
Dept: LAB | Age: 69
End: 2019-12-10
Attending: FAMILY MEDICINE
Payer: MEDICARE

## 2019-12-10 DIAGNOSIS — E11.9 TYPE 2 DIABETES MELLITUS WITHOUT COMPLICATION, WITHOUT LONG-TERM CURRENT USE OF INSULIN (HCC): ICD-10-CM

## 2019-12-10 DIAGNOSIS — E78.2 HYPERLIPIDEMIA, MIXED: ICD-10-CM

## 2019-12-10 PROCEDURE — 80061 LIPID PANEL: CPT

## 2019-12-10 PROCEDURE — 80053 COMPREHEN METABOLIC PANEL: CPT

## 2019-12-10 PROCEDURE — 36415 COLL VENOUS BLD VENIPUNCTURE: CPT

## 2019-12-10 PROCEDURE — 83036 HEMOGLOBIN GLYCOSYLATED A1C: CPT

## 2019-12-10 PROCEDURE — 82570 ASSAY OF URINE CREATININE: CPT

## 2019-12-10 PROCEDURE — 82043 UR ALBUMIN QUANTITATIVE: CPT

## 2019-12-12 ENCOUNTER — OFFICE VISIT (OUTPATIENT)
Dept: FAMILY MEDICINE CLINIC | Facility: CLINIC | Age: 69
End: 2019-12-12
Payer: MEDICARE

## 2019-12-12 VITALS
HEIGHT: 59 IN | OXYGEN SATURATION: 98 % | BODY MASS INDEX: 30.84 KG/M2 | SYSTOLIC BLOOD PRESSURE: 104 MMHG | HEART RATE: 57 BPM | DIASTOLIC BLOOD PRESSURE: 60 MMHG | RESPIRATION RATE: 16 BRPM | WEIGHT: 153 LBS | TEMPERATURE: 97 F

## 2019-12-12 DIAGNOSIS — E11.9 TYPE 2 DIABETES MELLITUS WITHOUT COMPLICATION, WITHOUT LONG-TERM CURRENT USE OF INSULIN (HCC): Primary | ICD-10-CM

## 2019-12-12 DIAGNOSIS — E55.9 VITAMIN D DEFICIENCY: ICD-10-CM

## 2019-12-12 DIAGNOSIS — E78.2 HYPERLIPIDEMIA, MIXED: ICD-10-CM

## 2019-12-12 DIAGNOSIS — Z23 NEED FOR VACCINATION: ICD-10-CM

## 2019-12-12 DIAGNOSIS — I65.22 ASYMPTOMATIC CAROTID ARTERY NARROWING WITHOUT INFARCTION, LEFT: ICD-10-CM

## 2019-12-12 PROCEDURE — 99214 OFFICE O/P EST MOD 30 MIN: CPT | Performed by: FAMILY MEDICINE

## 2019-12-12 PROCEDURE — G0008 ADMIN INFLUENZA VIRUS VAC: HCPCS | Performed by: FAMILY MEDICINE

## 2019-12-12 PROCEDURE — 90662 IIV NO PRSV INCREASED AG IM: CPT | Performed by: FAMILY MEDICINE

## 2019-12-12 RX ORDER — PIOGLITAZONEHYDROCHLORIDE 30 MG/1
30 TABLET ORAL DAILY
Qty: 90 TABLET | Refills: 1 | Status: SHIPPED | OUTPATIENT
Start: 2019-12-12 | End: 2020-06-12

## 2019-12-12 RX ORDER — ROSUVASTATIN CALCIUM 5 MG/1
5 TABLET, COATED ORAL DAILY
Qty: 90 TABLET | Refills: 1 | Status: SHIPPED | OUTPATIENT
Start: 2019-12-12 | End: 2020-06-12

## 2019-12-12 NOTE — PATIENT INSTRUCTIONS
Call [phone] to schedule Mammogram and US right breast.   Continue current meds. Watch diet for fats and carbs. Stay active. Fasting blood work prior visit.

## 2019-12-13 NOTE — PROGRESS NOTES
Nyla Tavarez is a 78year old female. cc diabetes, hyperlipidemia, D deficiency, carotid artery narrowing. HPI:   Coming for follow-up of diabetes. Patient is doing good. Her blood sugar is fluctuating in the morning.   She is monitor periodically Alcohol use:  Yes      Alcohol/week: 0.0 standard drinks      Comment: 1 glass of wine with dinner sometimes    Drug use: No       REVIEW OF SYSTEMS:   GENERAL HEALTH: feels well otherwise, no fever  SKIN: denies any unusual skin lesions or rashes  HEENT no Chloride 107 98 - 112 mmol/L    CO2 30.0 21.0 - 32.0 mmol/L    Anion Gap 3 0 - 18 mmol/L    BUN 14 7 - 18 mg/dL    Creatinine 0.72 0.55 - 1.02 mg/dL    BUN/CREA Ratio 19.4 10.0 - 20.0    Calcium, Total 9.0 8.5 - 10.1 mg/dL    Calculated Osmolality 293 275

## 2020-01-08 ENCOUNTER — OFFICE VISIT (OUTPATIENT)
Dept: FAMILY MEDICINE CLINIC | Facility: CLINIC | Age: 70
End: 2020-01-08
Payer: MEDICARE

## 2020-01-08 VITALS
SYSTOLIC BLOOD PRESSURE: 120 MMHG | HEART RATE: 67 BPM | BODY MASS INDEX: 30.84 KG/M2 | RESPIRATION RATE: 16 BRPM | TEMPERATURE: 99 F | DIASTOLIC BLOOD PRESSURE: 70 MMHG | HEIGHT: 59 IN | OXYGEN SATURATION: 98 % | WEIGHT: 153 LBS

## 2020-01-08 DIAGNOSIS — J11.1 INFLUENZA-LIKE ILLNESS: Primary | ICD-10-CM

## 2020-01-08 PROCEDURE — 99213 OFFICE O/P EST LOW 20 MIN: CPT | Performed by: NURSE PRACTITIONER

## 2020-01-08 NOTE — PATIENT INSTRUCTIONS
Viral Syndrome (Adult)  A viral illness may cause a number of symptoms such as fever. Other symptoms depend on the part of the body that the virus affects.  If it settles in your nose, throat, and lungs, it may cause cough, sore throat, congestion, runny · Your appetite may be poor, so a light diet is fine. Avoid dehydration by drinking 8 to 12, 8-ounce glasses of fluids each day.  This may include water; orange juice; lemonade; apple, grape, and cranberry juice; clear fruit drinks; electrolyte replacement © 7957-3173 [address] [facility], [facility] [place]. All rights reserved. This information is not intended as a substitute for professional medical care. Always follow your healthcare professional's instructions.

## 2020-01-08 NOTE — PROGRESS NOTES
CHIEF COMPLAINT:   Patient presents with:  URI: congestion, Right ear pressure,cough,sinus pressure,post nasal drip  sx x 4 day. HPI:   Yanet Knapp is a 78year old female who presents for upper respiratory symptoms for  4 days.  Patient repo /70 (BP Location: Right arm, Patient Position: Sitting, Cuff Size: adult)   Pulse 67   Temp 98.8 °F (37.1 °C) (Oral)   Resp 16   Ht 59\"   Wt 153 lb (69.4 kg)   SpO2 98%   BMI 30.90 kg/m²   GENERAL: well developed, well nourished,in no apparent distr A viral illness may cause a number of symptoms such as fever. Other symptoms depend on the part of the body that the virus affects.  If it settles in your nose, throat, and lungs, it may cause cough, sore throat, congestion, runny nose, headache, earache an · Your appetite may be poor, so a light diet is fine. Avoid dehydration by drinking 8 to 12, 8-ounce glasses of fluids each day.  This may include water; orange juice; lemonade; apple, grape, and cranberry juice; clear fruit drinks; electrolyte replacement © 4058-3058 [address] [facility], [facility] [place]. All rights reserved. This information is not intended as a substitute for professional medical care. Always follow your healthcare professional's instructions.

## 2020-01-20 ENCOUNTER — HOSPITAL ENCOUNTER (OUTPATIENT)
Dept: MAMMOGRAPHY | Facility: HOSPITAL | Age: 70
Discharge: HOME | End: 2020-01-20
Attending: FAMILY MEDICINE
Payer: MEDICARE

## 2020-01-20 DIAGNOSIS — R92.8 ABNORMAL MAMMOGRAM: ICD-10-CM

## 2020-01-20 PROCEDURE — 77065 DX MAMMO INCL CAD UNI: CPT | Performed by: FAMILY MEDICINE

## 2020-01-20 PROCEDURE — 76642 ULTRASOUND BREAST LIMITED: CPT | Performed by: FAMILY MEDICINE

## 2020-01-20 PROCEDURE — 77061 BREAST TOMOSYNTHESIS UNI: CPT | Performed by: FAMILY MEDICINE

## 2020-01-21 DIAGNOSIS — N63.0 BREAST NODULE: Primary | ICD-10-CM

## 2020-01-21 DIAGNOSIS — R92.8 OTHER ABNORMAL AND INCONCLUSIVE FINDINGS ON DIAGNOSTIC IMAGING OF BREAST: ICD-10-CM

## 2020-03-05 ENCOUNTER — PATIENT MESSAGE (OUTPATIENT)
Dept: FAMILY MEDICINE CLINIC | Facility: CLINIC | Age: 70
End: 2020-03-05

## 2020-03-05 NOTE — TELEPHONE ENCOUNTER
From: Elina Leung  To: Annia Lawson MD  Sent: 3/5/2020 11:42 AM CST  Subject: Other    Do I need fasting blood work before my appointment next week?

## 2020-03-06 ENCOUNTER — APPOINTMENT (OUTPATIENT)
Dept: LAB | Age: 70
End: 2020-03-06
Attending: FAMILY MEDICINE
Payer: MEDICARE

## 2020-03-06 DIAGNOSIS — E78.2 HYPERLIPIDEMIA, MIXED: ICD-10-CM

## 2020-03-06 DIAGNOSIS — E11.9 TYPE 2 DIABETES MELLITUS WITHOUT COMPLICATION, WITHOUT LONG-TERM CURRENT USE OF INSULIN (HCC): ICD-10-CM

## 2020-03-06 DIAGNOSIS — E55.9 VITAMIN D DEFICIENCY: ICD-10-CM

## 2020-03-06 LAB
ALBUMIN SERPL-MCNC: 3.6 G/DL (ref 3.4–5)
ALBUMIN/GLOB SERPL: 1 {RATIO} (ref 1–2)
ALP LIVER SERPL-CCNC: 77 U/L (ref 55–142)
ALT SERPL-CCNC: 33 U/L (ref 13–56)
ANION GAP SERPL CALC-SCNC: 3 MMOL/L (ref 0–18)
AST SERPL-CCNC: 16 U/L (ref 15–37)
BILIRUB SERPL-MCNC: 0.6 MG/DL (ref 0.1–2)
BUN BLD-MCNC: 14 MG/DL (ref 7–18)
BUN/CREAT SERPL: 20.9 (ref 10–20)
CALCIUM BLD-MCNC: 9 MG/DL (ref 8.5–10.1)
CHLORIDE SERPL-SCNC: 106 MMOL/L (ref 98–112)
CHOLEST SMN-MCNC: 161 MG/DL (ref ?–200)
CO2 SERPL-SCNC: 29 MMOL/L (ref 21–32)
CREAT BLD-MCNC: 0.67 MG/DL (ref 0.55–1.02)
CREAT UR-SCNC: 133 MG/DL
EST. AVERAGE GLUCOSE BLD GHB EST-MCNC: 160 MG/DL (ref 68–126)
GLOBULIN PLAS-MCNC: 3.6 G/DL (ref 2.8–4.4)
GLUCOSE BLD-MCNC: 155 MG/DL (ref 70–99)
HBA1C MFR BLD HPLC: 7.2 % (ref ?–5.7)
HDLC SERPL-MCNC: 67 MG/DL (ref 40–59)
LDLC SERPL CALC-MCNC: 75 MG/DL (ref ?–100)
M PROTEIN MFR SERPL ELPH: 7.2 G/DL (ref 6.4–8.2)
MICROALBUMIN UR-MCNC: 0.58 MG/DL
MICROALBUMIN/CREAT 24H UR-RTO: 4.4 UG/MG (ref ?–30)
NONHDLC SERPL-MCNC: 94 MG/DL (ref ?–130)
OSMOLALITY SERPL CALC.SUM OF ELEC: 290 MOSM/KG (ref 275–295)
PATIENT FASTING Y/N/NP: YES
PATIENT FASTING Y/N/NP: YES
POTASSIUM SERPL-SCNC: 4.1 MMOL/L (ref 3.5–5.1)
SODIUM SERPL-SCNC: 138 MMOL/L (ref 136–145)
TRIGL SERPL-MCNC: 93 MG/DL (ref 30–149)
VIT D+METAB SERPL-MCNC: 39.4 NG/ML (ref 30–100)
VLDLC SERPL CALC-MCNC: 19 MG/DL (ref 0–30)

## 2020-03-06 PROCEDURE — 82306 VITAMIN D 25 HYDROXY: CPT

## 2020-03-06 PROCEDURE — 83036 HEMOGLOBIN GLYCOSYLATED A1C: CPT

## 2020-03-06 PROCEDURE — 80053 COMPREHEN METABOLIC PANEL: CPT

## 2020-03-06 PROCEDURE — 36415 COLL VENOUS BLD VENIPUNCTURE: CPT

## 2020-03-06 PROCEDURE — 80061 LIPID PANEL: CPT

## 2020-03-06 PROCEDURE — 82043 UR ALBUMIN QUANTITATIVE: CPT

## 2020-03-06 PROCEDURE — 82570 ASSAY OF URINE CREATININE: CPT

## 2020-03-12 ENCOUNTER — OFFICE VISIT (OUTPATIENT)
Dept: FAMILY MEDICINE CLINIC | Facility: CLINIC | Age: 70
End: 2020-03-12
Payer: MEDICARE

## 2020-03-12 VITALS
TEMPERATURE: 98 F | BODY MASS INDEX: 30.84 KG/M2 | HEIGHT: 59 IN | SYSTOLIC BLOOD PRESSURE: 130 MMHG | OXYGEN SATURATION: 99 % | WEIGHT: 153 LBS | RESPIRATION RATE: 16 BRPM | DIASTOLIC BLOOD PRESSURE: 78 MMHG | HEART RATE: 62 BPM

## 2020-03-12 DIAGNOSIS — R09.81 NASAL CONGESTION: ICD-10-CM

## 2020-03-12 DIAGNOSIS — E78.2 HYPERLIPIDEMIA, MIXED: Primary | ICD-10-CM

## 2020-03-12 DIAGNOSIS — E55.9 VITAMIN D DEFICIENCY: ICD-10-CM

## 2020-03-12 DIAGNOSIS — E11.9 TYPE 2 DIABETES MELLITUS WITHOUT COMPLICATION, WITHOUT LONG-TERM CURRENT USE OF INSULIN (HCC): ICD-10-CM

## 2020-03-12 PROCEDURE — 99214 OFFICE O/P EST MOD 30 MIN: CPT | Performed by: FAMILY MEDICINE

## 2020-03-12 NOTE — PROGRESS NOTES
Destinee Lucas is a 69year old female. cc diabetes, hyperlipidemia, D deficiency, carotid artery narrowing. HPI:   Coming for follow-up of diabetes. Patient is doing good. Her blood sugar is fluctuating in the morning.   She is monitor periodically • alendronate 35 MG Oral Tab Take 1 tablet (35 mg total) by mouth every 7 days.  (Patient not taking: Reported on 3/12/2020 ) 12 tablet 1      Past Medical History:   Diagnosis Date   • Diabetes [facility])       Social History:  Social History    Tobacco Use RANDOM URINE   Result Value Ref Range    Microalbumin, Urine 0.58 mg/dL    Creatinine Ur Random 133.00 mg/dL    Malb/Cre Calc 4.4 <=30.0 ug/mg   HEMOGLOBIN A1C   Result Value Ref Range    HgbA1C 7.2 (H) <5.7 %    Estimated Average Glucose 160 (H) 68 - 126 issues and agrees to the plan. The patient is asked to return in June 2020 after test results. Emerson Garcia

## 2020-03-12 NOTE — PATIENT INSTRUCTIONS
Coricidin - decongestant  Flonase  Start Farxiga 5 mg 1 tablet once a day. Continue other medications for your diabetes. Healthy diet. Keep good hydration. Do fasting blood work 1 week prior next visit.

## 2020-03-20 ENCOUNTER — TELEPHONE (OUTPATIENT)
Dept: FAMILY MEDICINE CLINIC | Facility: CLINIC | Age: 70
End: 2020-03-20

## 2020-03-20 DIAGNOSIS — E11.9 TYPE 2 DIABETES MELLITUS WITHOUT COMPLICATION, WITHOUT LONG-TERM CURRENT USE OF INSULIN (HCC): Primary | ICD-10-CM

## 2020-03-20 NOTE — TELEPHONE ENCOUNTER
Received request for a PA to be done for pt's Farxiga 5mg tablets. Form printed from online site filled out until the end where it stated that if the pt has not tried Chales Ina, Synjardy (or XR) the medication is not covered.   Note sent and mejia

## 2020-03-21 NOTE — TELEPHONE ENCOUNTER
So we will trial Jardiance 10 mg 1 tablet daily number 90 tablets 1 refill we can send it electronically to patient's pharmacy so she does not have to drive around to  the prescription from our office.   I would like patient to review side effect pro

## 2020-03-23 NOTE — TELEPHONE ENCOUNTER
Called to pt, advised her of provider recommendations. Pt still requested that a paper copy of the prescription be done. She will come around 3 today to . Advised pt to call the office and ask for Roselyn Needles to bring it out.   Please see pended medica

## 2020-03-26 ENCOUNTER — TELEPHONE (OUTPATIENT)
Dept: FAMILY MEDICINE CLINIC | Facility: CLINIC | Age: 70
End: 2020-03-26

## 2020-03-26 DIAGNOSIS — E11.9 TYPE 2 DIABETES MELLITUS WITHOUT COMPLICATION, WITHOUT LONG-TERM CURRENT USE OF INSULIN (HCC): ICD-10-CM

## 2020-03-26 NOTE — TELEPHONE ENCOUNTER
Received request for a PA to be completed for pt's Jardiance. Called to Maritza Bellamy [phone], spoke with [facility]. PA completed over the phone, Horacio Saucedo is approved 2/25/20-12/25/2099.   Case ID [id number]      Called to [employer] spoke with Caliopa Amy,

## 2020-03-27 NOTE — TELEPHONE ENCOUNTER
Received letter of determination from  Maura Vasquez, Jardiance Tablets has been approved until 12/31/2099.

## 2020-06-09 ENCOUNTER — APPOINTMENT (OUTPATIENT)
Dept: LAB | Age: 70
End: 2020-06-09
Attending: FAMILY MEDICINE
Payer: MEDICARE

## 2020-06-09 DIAGNOSIS — E78.2 HYPERLIPIDEMIA, MIXED: ICD-10-CM

## 2020-06-09 DIAGNOSIS — E11.9 TYPE 2 DIABETES MELLITUS WITHOUT COMPLICATION, WITHOUT LONG-TERM CURRENT USE OF INSULIN (HCC): ICD-10-CM

## 2020-06-09 PROCEDURE — 83036 HEMOGLOBIN GLYCOSYLATED A1C: CPT

## 2020-06-09 PROCEDURE — 80061 LIPID PANEL: CPT

## 2020-06-09 PROCEDURE — 82570 ASSAY OF URINE CREATININE: CPT

## 2020-06-09 PROCEDURE — 82043 UR ALBUMIN QUANTITATIVE: CPT

## 2020-06-09 PROCEDURE — 80053 COMPREHEN METABOLIC PANEL: CPT

## 2020-06-09 PROCEDURE — 36415 COLL VENOUS BLD VENIPUNCTURE: CPT

## 2020-06-12 ENCOUNTER — OFFICE VISIT (OUTPATIENT)
Dept: FAMILY MEDICINE CLINIC | Facility: CLINIC | Age: 70
End: 2020-06-12
Payer: MEDICARE

## 2020-06-12 VITALS
DIASTOLIC BLOOD PRESSURE: 76 MMHG | RESPIRATION RATE: 16 BRPM | BODY MASS INDEX: 30.84 KG/M2 | HEIGHT: 59 IN | OXYGEN SATURATION: 98 % | WEIGHT: 153 LBS | TEMPERATURE: 99 F | HEART RATE: 63 BPM | SYSTOLIC BLOOD PRESSURE: 124 MMHG

## 2020-06-12 DIAGNOSIS — E78.2 HYPERLIPIDEMIA, MIXED: ICD-10-CM

## 2020-06-12 DIAGNOSIS — E11.59 TYPE 2 DIABETES MELLITUS WITH OTHER CIRCULATORY COMPLICATIONS (HCC): ICD-10-CM

## 2020-06-12 DIAGNOSIS — Z00.00 ENCOUNTER FOR ANNUAL HEALTH EXAMINATION: Primary | ICD-10-CM

## 2020-06-12 DIAGNOSIS — E55.9 VITAMIN D DEFICIENCY: ICD-10-CM

## 2020-06-12 DIAGNOSIS — E66.9 OBESITY (BMI 30.0-34.9): ICD-10-CM

## 2020-06-12 DIAGNOSIS — I65.22 LEFT CAROTID STENOSIS: ICD-10-CM

## 2020-06-12 DIAGNOSIS — M85.80 OSTEOPENIA, UNSPECIFIED LOCATION: ICD-10-CM

## 2020-06-12 PROCEDURE — G0444 DEPRESSION SCREEN ANNUAL: HCPCS | Performed by: FAMILY MEDICINE

## 2020-06-12 PROCEDURE — 99214 OFFICE O/P EST MOD 30 MIN: CPT | Performed by: FAMILY MEDICINE

## 2020-06-12 PROCEDURE — G0439 PPPS, SUBSEQ VISIT: HCPCS | Performed by: FAMILY MEDICINE

## 2020-06-12 RX ORDER — PIOGLITAZONEHYDROCHLORIDE 30 MG/1
30 TABLET ORAL DAILY
Qty: 90 TABLET | Refills: 1 | Status: SHIPPED | OUTPATIENT
Start: 2020-06-12 | End: 2020-12-30

## 2020-06-12 RX ORDER — ROSUVASTATIN CALCIUM 5 MG/1
5 TABLET, COATED ORAL DAILY
Qty: 90 TABLET | Refills: 1 | Status: SHIPPED | OUTPATIENT
Start: 2020-06-12 | End: 2021-01-07

## 2020-06-12 NOTE — PATIENT INSTRUCTIONS
Call [phone] to schedule ultrasound of the carotids and blood work. Continue current meds. Watch diet for fats and carbs. Stay active.      Massiel Anders's SCREENING SCHEDULE   Tests on this list are recommended by your physician but may no cigarettes in their lifetime   • Anyone with a family history    Colorectal Cancer Screening  Covered up to Age 76     Colonoscopy Screen   Covered every 10 years- more often if abnormal Colonoscopy due on 07/21/2021 [facility] if applicable FLU VACC HIGH DOSE PRSV FREE    Please get every year    Pneumococcal 13 (Prevnar)  Covered Once after 65 Orders placed or performed in visit on 01/18/16   • PNEUMOCOCCAL VACC, 13 LOGAN IM    Please get once after your 65th birthday    Pneumococcal 23 (Pneum

## 2020-06-12 NOTE — PROGRESS NOTES
HPI:   Coleen Oakley is a 69year old female who presents for a Medicare Subsequent Annual Wellness visit (Pt already had Initial Annual Wellness) also   patient is diabetic, postmenopausal, hyperlipidemia.      Diabetes patient is taking her medic CardioDx, and patient is instructed to get our office a copy of it for scanning into Epic.          She has never smoked tobacco.  Ms. Frida Ferro already takes aspirin and has it on her medication list.   CAGE Alcohol screening   Janes Downs was screened fo Vitro Strip, Test 3 times daily  aspirin 81 MG Oral Tab, Take 81 mg by mouth daily. MEDICAL INFORMATION:   She  has a past medical history of Diabetes ([facility] [address]     She  has a past surgical history that includes tonsillectomy; other surgical history; and TM's and external ear canals, both ears   Nose: Nares normal, septum midline,mucosa normal, no drainage or sinus tenderness   Throat: Lips, mucosa, and tongue normal; teeth and gums normal   Neck: Supple, symmetrical, trachea midline, no adenopathy;  thyro - 149 mg/dL    LDL Cholesterol 66 <100 mg/dL    VLDL 18 0 - 30 mg/dL    Non HDL Chol 84 <130 mg/dL    FASTING Yes    MICROALB/CREAT RATIO, RANDOM URINE   Result Value Ref Range    Microalbumin, Urine 0.69 mg/dL    Creatinine Ur Random 126.00 mg/dL    Malb/ Creatinine Ur Random 126.00 mg/dL    Malb/Cre Calc 5.5 <=30.0 ug/mg   HEMOGLOBIN A1C   Result Value Ref Range    HgbA1C 6.9 (H) <5.7 %    Estimated Average Glucose 151 (H) 68 - 126 mg/dL       ASSESSMENT AND OTHER RELEVANT CHRONIC CONDITIONS:   Lynda Hargrove Call eye doctor for evaluation Dr. Tayla Darden. Do blood work fasting end of June of this year. The patient indicates understanding of these issues and agrees to the plan. Reinforced healthy diet, lifestyle, and exercise. Imaging studies ordered. Last Dilated Eye Exam 8/6/2019       Bone Density Screening      Dexascan Every two years Last Dexa Scan:   XR DEXA BONE DENSITOMETRY (CPT=77080) 05/29/2019    No flowsheet data found.     Pap and Pelvic      Pap: Every 3 yrs age 21-65 or Pap+HPV every 5 06/09/2020 5.5        LDL  Annually LDL Cholesterol (mg/dL)   Date Value   06/09/2020 66    No flowsheet data found. Dilated Eye exam  Annually Data entered on: 8/6/2019   Last Dilated Eye Exam 8/6/2019     No flowsheet data found.             [place]at

## 2020-07-21 ENCOUNTER — HOSPITAL ENCOUNTER (OUTPATIENT)
Dept: MAMMOGRAPHY | Facility: HOSPITAL | Age: 70
Discharge: HOME | End: 2020-07-21
Attending: FAMILY MEDICINE
Payer: MEDICARE

## 2020-07-21 DIAGNOSIS — N63.0 BREAST NODULE: ICD-10-CM

## 2020-07-21 DIAGNOSIS — R92.8 OTHER ABNORMAL AND INCONCLUSIVE FINDINGS ON DIAGNOSTIC IMAGING OF BREAST: ICD-10-CM

## 2020-07-21 PROCEDURE — 76642 ULTRASOUND BREAST LIMITED: CPT | Performed by: FAMILY MEDICINE

## 2020-07-21 PROCEDURE — 77062 BREAST TOMOSYNTHESIS BI: CPT | Performed by: FAMILY MEDICINE

## 2020-07-21 PROCEDURE — 77066 DX MAMMO INCL CAD BI: CPT | Performed by: FAMILY MEDICINE

## 2020-08-24 RX ORDER — EMPAGLIFLOZIN 10 MG/1
TABLET, FILM COATED ORAL
Qty: 90 TABLET | Refills: 0 | Status: SHIPPED | OUTPATIENT
Start: 2020-08-24 | End: 2020-11-24

## 2020-08-24 NOTE — TELEPHONE ENCOUNTER
LOV: 6/12/2020 MACELSA  Last refill: 3/23/2020 90 tabs 1 refill    Jardiance is discontinued on her med list, but in your last note, it looks like she is still taking this. Please refill if appropriate. Thank you.

## 2020-08-24 NOTE — TELEPHONE ENCOUNTER
Dr. Ray Solares - roberegshannon previous note.  This was not in her med list as it required prior auth and was phoned in to pharmacy for last refill

## 2020-11-24 RX ORDER — EMPAGLIFLOZIN 10 MG/1
1 TABLET, FILM COATED ORAL DAILY
Qty: 90 TABLET | Refills: 0 | Status: SHIPPED | OUTPATIENT
Start: 2020-11-24 | End: 2022-03-09

## 2020-12-30 DIAGNOSIS — E11.59 TYPE 2 DIABETES MELLITUS WITH OTHER CIRCULATORY COMPLICATIONS (HCC): ICD-10-CM

## 2020-12-30 DIAGNOSIS — E78.2 HYPERLIPIDEMIA, MIXED: ICD-10-CM

## 2020-12-30 NOTE — TELEPHONE ENCOUNTER
Pt requested refills of:  SITagliptin Phosphate 100 MG Oral Tab 90 tablet 1       Rosuvastatin Calcium 5 MG Oral Tab 90 tablet 1     Pioglitazone HCl 30 MG Oral Tab 90 tablet 1     To be sent to:[facility] [address]

## 2021-01-07 RX ORDER — ROSUVASTATIN CALCIUM 5 MG/1
5 TABLET, COATED ORAL DAILY
Qty: 90 TABLET | Refills: 0 | Status: SHIPPED | OUTPATIENT
Start: 2021-01-07

## 2021-01-07 RX ORDER — PIOGLITAZONEHYDROCHLORIDE 30 MG/1
30 TABLET ORAL DAILY
Qty: 90 TABLET | Refills: 0 | Status: SHIPPED | OUTPATIENT
Start: 2021-01-07

## 2021-01-07 NOTE — TELEPHONE ENCOUNTER
SITagliptin Phosphate 100 MG Oral Tab    Pioglitazone HCl 30 MG Oral Tab    Medication failed protocol due to no recent labs. Please see pended medications. Please sign if appropriate.       Thank you      A MyChart message was sent to the pt to call

## 2021-02-03 DIAGNOSIS — Z23 NEED FOR VACCINATION: ICD-10-CM

## 2021-04-06 ENCOUNTER — TELEPHONE (OUTPATIENT)
Dept: FAMILY MEDICINE CLINIC | Facility: CLINIC | Age: 71
End: 2021-04-06

## 2021-11-03 ENCOUNTER — TELEPHONE (OUTPATIENT)
Dept: FAMILY MEDICINE CLINIC | Facility: CLINIC | Age: 71
End: 2021-11-03

## 2021-11-03 NOTE — TELEPHONE ENCOUNTER
Spoke with the pt and she stated that [place] and will call to schedule her appt once she gets back situated.

## 2022-03-09 ENCOUNTER — OFFICE VISIT (OUTPATIENT)
Dept: FAMILY MEDICINE CLINIC | Facility: CLINIC | Age: 72
End: 2022-03-09
Payer: MEDICARE

## 2022-03-09 VITALS
HEIGHT: 59 IN | TEMPERATURE: 98 F | BODY MASS INDEX: 30.84 KG/M2 | WEIGHT: 153 LBS | RESPIRATION RATE: 16 BRPM | DIASTOLIC BLOOD PRESSURE: 62 MMHG | SYSTOLIC BLOOD PRESSURE: 110 MMHG | HEART RATE: 66 BPM | OXYGEN SATURATION: 98 %

## 2022-03-09 DIAGNOSIS — E11.9 TYPE 2 DIABETES MELLITUS WITHOUT COMPLICATION, WITHOUT LONG-TERM CURRENT USE OF INSULIN (HCC): ICD-10-CM

## 2022-03-09 DIAGNOSIS — I65.22 LEFT CAROTID STENOSIS: ICD-10-CM

## 2022-03-09 DIAGNOSIS — E78.00 TYPE 2 DIABETES MELLITUS WITH HYPERCHOLESTEROLEMIA (HCC): ICD-10-CM

## 2022-03-09 DIAGNOSIS — Z12.31 ENCOUNTER FOR SCREENING MAMMOGRAM FOR MALIGNANT NEOPLASM OF BREAST: ICD-10-CM

## 2022-03-09 DIAGNOSIS — R25.2 LEG CRAMPS: ICD-10-CM

## 2022-03-09 DIAGNOSIS — M85.80 OSTEOPENIA, UNSPECIFIED LOCATION: ICD-10-CM

## 2022-03-09 DIAGNOSIS — E78.2 HYPERLIPIDEMIA, MIXED: ICD-10-CM

## 2022-03-09 DIAGNOSIS — Z78.0 POSTMENOPAUSAL: ICD-10-CM

## 2022-03-09 DIAGNOSIS — E66.9 OBESITY (BMI 30.0-34.9): ICD-10-CM

## 2022-03-09 DIAGNOSIS — Z12.11 SCREEN FOR COLON CANCER: ICD-10-CM

## 2022-03-09 DIAGNOSIS — E55.9 VITAMIN D DEFICIENCY: ICD-10-CM

## 2022-03-09 DIAGNOSIS — Z00.00 ENCOUNTER FOR ANNUAL HEALTH EXAMINATION: Primary | ICD-10-CM

## 2022-03-09 DIAGNOSIS — R09.81 NASAL CONGESTION: ICD-10-CM

## 2022-03-09 DIAGNOSIS — R12 HEARTBURN: ICD-10-CM

## 2022-03-09 DIAGNOSIS — E11.69 TYPE 2 DIABETES MELLITUS WITH HYPERCHOLESTEROLEMIA (HCC): ICD-10-CM

## 2022-03-09 PROCEDURE — G0439 PPPS, SUBSEQ VISIT: HCPCS | Performed by: FAMILY MEDICINE

## 2022-03-09 PROCEDURE — G0444 DEPRESSION SCREEN ANNUAL: HCPCS | Performed by: FAMILY MEDICINE

## 2022-03-09 PROCEDURE — 99214 OFFICE O/P EST MOD 30 MIN: CPT | Performed by: FAMILY MEDICINE

## 2022-03-09 RX ORDER — ROSUVASTATIN CALCIUM 5 MG/1
5 TABLET, COATED ORAL DAILY
Qty: 90 TABLET | Refills: 0 | Status: SHIPPED | OUTPATIENT
Start: 2022-03-09

## 2022-03-09 RX ORDER — FAMOTIDINE 40 MG/1
40 TABLET, FILM COATED ORAL 2 TIMES DAILY
Qty: 180 TABLET | Refills: 0 | Status: SHIPPED | OUTPATIENT
Start: 2022-03-09

## 2022-03-09 RX ORDER — FAMOTIDINE 40 MG/1
1 TABLET, FILM COATED ORAL DAILY
COMMUNITY
End: 2022-03-09

## 2022-03-09 RX ORDER — PIOGLITAZONEHYDROCHLORIDE 30 MG/1
30 TABLET ORAL DAILY
Qty: 90 TABLET | Refills: 0 | Status: SHIPPED | OUTPATIENT
Start: 2022-03-09

## 2022-03-09 RX ORDER — FLUTICASONE PROPIONATE 50 MCG
2 SPRAY, SUSPENSION (ML) NASAL DAILY
Qty: 1 EACH | Refills: 0 | Status: SHIPPED | OUTPATIENT
Start: 2022-03-09 | End: 2023-03-04

## 2022-03-10 ENCOUNTER — LABORATORY ENCOUNTER (OUTPATIENT)
Dept: LAB | Age: 72
End: 2022-03-10
Attending: FAMILY MEDICINE
Payer: MEDICARE

## 2022-03-10 DIAGNOSIS — E78.00 TYPE 2 DIABETES MELLITUS WITH HYPERCHOLESTEROLEMIA (HCC): ICD-10-CM

## 2022-03-10 DIAGNOSIS — E11.9 TYPE 2 DIABETES MELLITUS WITHOUT COMPLICATION, WITHOUT LONG-TERM CURRENT USE OF INSULIN (HCC): ICD-10-CM

## 2022-03-10 DIAGNOSIS — E78.2 HYPERLIPIDEMIA, MIXED: ICD-10-CM

## 2022-03-10 DIAGNOSIS — R25.2 LEG CRAMPS: ICD-10-CM

## 2022-03-10 DIAGNOSIS — E11.69 TYPE 2 DIABETES MELLITUS WITH HYPERCHOLESTEROLEMIA (HCC): ICD-10-CM

## 2022-03-10 LAB
ALBUMIN SERPL-MCNC: 3.8 G/DL (ref 3.4–5)
ALBUMIN/GLOB SERPL: 1.1 {RATIO} (ref 1–2)
ALP LIVER SERPL-CCNC: 88 U/L
ALT SERPL-CCNC: 25 U/L
ANION GAP SERPL CALC-SCNC: 5 MMOL/L (ref 0–18)
AST SERPL-CCNC: 15 U/L (ref 15–37)
BASOPHILS # BLD AUTO: 0.09 X10(3) UL (ref 0–0.2)
BASOPHILS NFR BLD AUTO: 1.3 %
BILIRUB SERPL-MCNC: 0.4 MG/DL (ref 0.1–2)
BILIRUB UR QL STRIP.AUTO: NEGATIVE
BUN BLD-MCNC: 21 MG/DL (ref 7–18)
CALCIUM BLD-MCNC: 9.6 MG/DL (ref 8.5–10.1)
CHLORIDE SERPL-SCNC: 108 MMOL/L (ref 98–112)
CHOLEST SERPL-MCNC: 185 MG/DL (ref ?–200)
CO2 SERPL-SCNC: 27 MMOL/L (ref 21–32)
COLOR UR AUTO: YELLOW
CREAT BLD-MCNC: 0.72 MG/DL
CREAT UR-SCNC: 69.5 MG/DL
EOSINOPHIL # BLD AUTO: 0.32 X10(3) UL (ref 0–0.7)
EOSINOPHIL NFR BLD AUTO: 4.6 %
EST. AVERAGE GLUCOSE BLD GHB EST-MCNC: 166 MG/DL (ref 68–126)
FASTING PATIENT LIPID ANSWER: YES
FASTING STATUS PATIENT QL REPORTED: YES
GLOBULIN PLAS-MCNC: 3.6 G/DL (ref 2.8–4.4)
GLUCOSE BLD-MCNC: 152 MG/DL (ref 70–99)
GLUCOSE UR STRIP.AUTO-MCNC: >=500 MG/DL
HBA1C MFR BLD: 7.4 % (ref ?–5.7)
HCT VFR BLD AUTO: 47.5 %
HDLC SERPL-MCNC: 61 MG/DL (ref 40–59)
HGB BLD-MCNC: 15.2 G/DL
IMM GRANULOCYTES # BLD AUTO: 0.03 X10(3) UL (ref 0–1)
IMM GRANULOCYTES NFR BLD: 0.4 %
KETONES UR STRIP.AUTO-MCNC: NEGATIVE MG/DL
LDLC SERPL CALC-MCNC: 103 MG/DL (ref ?–100)
LYMPHOCYTES # BLD AUTO: 1.55 X10(3) UL (ref 1–4)
LYMPHOCYTES NFR BLD AUTO: 22.2 %
MAGNESIUM SERPL-MCNC: 2.5 MG/DL (ref 1.6–2.6)
MCHC RBC AUTO-ENTMCNC: 32 G/DL (ref 31–37)
MCV RBC AUTO: 91.7 FL
MICROALBUMIN/CREAT 24H UR-RTO: 13.1 UG/MG (ref ?–30)
MONOCYTES # BLD AUTO: 0.74 X10(3) UL (ref 0.1–1)
MONOCYTES NFR BLD AUTO: 10.6 %
NEUTROPHILS # BLD AUTO: 4.25 X10 (3) UL (ref 1.5–7.7)
NEUTROPHILS # BLD AUTO: 4.25 X10(3) UL (ref 1.5–7.7)
NEUTROPHILS NFR BLD AUTO: 60.9 %
NITRITE UR QL STRIP.AUTO: POSITIVE
NONHDLC SERPL-MCNC: 124 MG/DL (ref ?–130)
OSMOLALITY SERPL CALC.SUM OF ELEC: 296 MOSM/KG (ref 275–295)
PH UR STRIP.AUTO: 5 [PH] (ref 5–8)
PLATELET # BLD AUTO: 322 10(3)UL (ref 150–450)
POTASSIUM SERPL-SCNC: 4.5 MMOL/L (ref 3.5–5.1)
PROT UR STRIP.AUTO-MCNC: NEGATIVE MG/DL
RBC # BLD AUTO: 5.18 X10(6)UL
SODIUM SERPL-SCNC: 140 MMOL/L (ref 136–145)
SP GR UR STRIP.AUTO: 1.03 (ref 1–1.03)
TRIGL SERPL-MCNC: 118 MG/DL (ref 30–149)
TSI SER-ACNC: 1.29 MIU/ML (ref 0.36–3.74)
UROBILINOGEN UR STRIP.AUTO-MCNC: <2 MG/DL
VLDLC SERPL CALC-MCNC: 20 MG/DL (ref 0–30)
WBC # BLD AUTO: 7 X10(3) UL (ref 4–11)

## 2022-03-10 PROCEDURE — 81001 URINALYSIS AUTO W/SCOPE: CPT

## 2022-03-10 PROCEDURE — 36415 COLL VENOUS BLD VENIPUNCTURE: CPT

## 2022-03-10 PROCEDURE — 80061 LIPID PANEL: CPT

## 2022-03-10 PROCEDURE — 82043 UR ALBUMIN QUANTITATIVE: CPT

## 2022-03-10 PROCEDURE — 87186 SC STD MICRODIL/AGAR DIL: CPT

## 2022-03-10 PROCEDURE — 84443 ASSAY THYROID STIM HORMONE: CPT

## 2022-03-10 PROCEDURE — 87086 URINE CULTURE/COLONY COUNT: CPT

## 2022-03-10 PROCEDURE — 83036 HEMOGLOBIN GLYCOSYLATED A1C: CPT

## 2022-03-10 PROCEDURE — 87088 URINE BACTERIA CULTURE: CPT

## 2022-03-10 PROCEDURE — 85025 COMPLETE CBC W/AUTO DIFF WBC: CPT

## 2022-03-10 PROCEDURE — 82570 ASSAY OF URINE CREATININE: CPT

## 2022-03-10 PROCEDURE — 83735 ASSAY OF MAGNESIUM: CPT

## 2022-03-10 PROCEDURE — 80053 COMPREHEN METABOLIC PANEL: CPT

## 2022-03-11 RX ORDER — CEPHALEXIN 500 MG/1
500 CAPSULE ORAL EVERY 8 HOURS
Qty: 15 CAPSULE | Refills: 0 | Status: SHIPPED | OUTPATIENT
Start: 2022-03-11 | End: 2022-03-16

## 2022-03-29 ENCOUNTER — MED REC SCAN ONLY (OUTPATIENT)
Dept: FAMILY MEDICINE CLINIC | Facility: CLINIC | Age: 72
End: 2022-03-29

## 2022-05-16 ENCOUNTER — ORDER TRANSCRIPTION (OUTPATIENT)
Dept: ADMINISTRATIVE | Facility: HOSPITAL | Age: 72
End: 2022-05-16

## 2022-05-17 ENCOUNTER — HOSPITAL ENCOUNTER (OUTPATIENT)
Dept: MAMMOGRAPHY | Facility: HOSPITAL | Age: 72
Discharge: HOME | End: 2022-05-17
Attending: FAMILY MEDICINE
Payer: MEDICARE

## 2022-05-17 ENCOUNTER — HOSPITAL ENCOUNTER (OUTPATIENT)
Dept: CT IMAGING | Facility: HOSPITAL | Age: 72
Discharge: HOME | End: 2022-05-17
Attending: FAMILY MEDICINE
Payer: MEDICARE

## 2022-05-17 DIAGNOSIS — Z12.31 ENCOUNTER FOR SCREENING MAMMOGRAM FOR MALIGNANT NEOPLASM OF BREAST: ICD-10-CM

## 2022-05-17 DIAGNOSIS — Z13.6 SCREENING FOR CARDIOVASCULAR CONDITION: ICD-10-CM

## 2022-05-17 PROCEDURE — 77063 BREAST TOMOSYNTHESIS BI: CPT | Performed by: FAMILY MEDICINE

## 2022-05-17 PROCEDURE — 77067 SCR MAMMO BI INCL CAD: CPT | Performed by: FAMILY MEDICINE

## 2022-05-18 ENCOUNTER — HOSPITAL ENCOUNTER (OUTPATIENT)
Dept: BONE DENSITY | Age: 72
Discharge: HOME | End: 2022-05-18
Attending: FAMILY MEDICINE
Payer: MEDICARE

## 2022-05-18 DIAGNOSIS — Z78.0 POSTMENOPAUSAL: ICD-10-CM

## 2022-05-18 PROBLEM — K21.9 GASTROESOPHAGEAL REFLUX DISEASE: Status: ACTIVE | Noted: 2022-05-18

## 2022-05-18 PROBLEM — Z12.11 COLON CANCER SCREENING: Status: ACTIVE | Noted: 2022-05-18

## 2022-05-18 PROCEDURE — 77080 DXA BONE DENSITY AXIAL: CPT | Performed by: FAMILY MEDICINE

## 2022-05-24 ENCOUNTER — TELEPHONE (OUTPATIENT)
Dept: FAMILY MEDICINE CLINIC | Facility: CLINIC | Age: 72
End: 2022-05-24

## 2022-06-15 ENCOUNTER — TELEPHONE (OUTPATIENT)
Dept: FAMILY MEDICINE CLINIC | Facility: CLINIC | Age: 72
End: 2022-06-15

## 2022-06-15 ENCOUNTER — OFFICE VISIT (OUTPATIENT)
Dept: FAMILY MEDICINE CLINIC | Facility: CLINIC | Age: 72
End: 2022-06-15
Payer: MEDICARE

## 2022-06-15 VITALS
TEMPERATURE: 98 F | SYSTOLIC BLOOD PRESSURE: 114 MMHG | DIASTOLIC BLOOD PRESSURE: 62 MMHG | WEIGHT: 150 LBS | RESPIRATION RATE: 14 BRPM | BODY MASS INDEX: 29.45 KG/M2 | HEART RATE: 68 BPM | HEIGHT: 60 IN

## 2022-06-15 DIAGNOSIS — E11.69 TYPE 2 DIABETES MELLITUS WITH HYPERCHOLESTEROLEMIA (HCC): ICD-10-CM

## 2022-06-15 DIAGNOSIS — Z23 NEED FOR HEPATITIS B VACCINATION: ICD-10-CM

## 2022-06-15 DIAGNOSIS — R91.1 LUNG NODULE < 6CM ON CT: ICD-10-CM

## 2022-06-15 DIAGNOSIS — E78.2 HYPERLIPIDEMIA, MIXED: ICD-10-CM

## 2022-06-15 DIAGNOSIS — E11.9 TYPE 2 DIABETES MELLITUS WITHOUT COMPLICATION, WITHOUT LONG-TERM CURRENT USE OF INSULIN (HCC): Primary | ICD-10-CM

## 2022-06-15 DIAGNOSIS — R12 HEARTBURN: ICD-10-CM

## 2022-06-15 DIAGNOSIS — E78.00 TYPE 2 DIABETES MELLITUS WITH HYPERCHOLESTEROLEMIA (HCC): ICD-10-CM

## 2022-06-15 PROCEDURE — G0010 ADMIN HEPATITIS B VACCINE: HCPCS | Performed by: FAMILY MEDICINE

## 2022-06-15 PROCEDURE — 99214 OFFICE O/P EST MOD 30 MIN: CPT | Performed by: FAMILY MEDICINE

## 2022-06-15 PROCEDURE — 90746 HEPB VACCINE 3 DOSE ADULT IM: CPT | Performed by: FAMILY MEDICINE

## 2022-06-15 RX ORDER — EMPAGLIFLOZIN 25 MG/1
25 TABLET, FILM COATED ORAL DAILY
Qty: 90 TABLET | Refills: 1 | Status: SHIPPED | OUTPATIENT
Start: 2022-06-15

## 2022-06-15 RX ORDER — ROSUVASTATIN CALCIUM 5 MG/1
5 TABLET, COATED ORAL DAILY
Qty: 90 TABLET | Refills: 1 | Status: SHIPPED | OUTPATIENT
Start: 2022-06-15

## 2022-06-15 RX ORDER — LANCETS 28 GAUGE
EACH MISCELLANEOUS
Qty: 100 EACH | Refills: 2 | Status: SHIPPED | OUTPATIENT
Start: 2022-06-15

## 2022-06-15 RX ORDER — PIOGLITAZONEHYDROCHLORIDE 30 MG/1
30 TABLET ORAL DAILY
Qty: 90 TABLET | Refills: 1 | Status: SHIPPED | OUTPATIENT
Start: 2022-06-15

## 2022-06-15 RX ORDER — FAMOTIDINE 40 MG/1
40 TABLET, FILM COATED ORAL 2 TIMES DAILY
Qty: 180 TABLET | Refills: 1 | Status: SHIPPED | OUTPATIENT
Start: 2022-06-15

## 2022-06-15 NOTE — PATIENT INSTRUCTIONS
Continue current meds. Watch diet for fats and carbs. Stay active. Have your colonoscopy completed. Schedule eye examination.

## 2022-06-15 NOTE — TELEPHONE ENCOUNTER
Patient signed medical records request form in office to receive records from [facility]. Request form faxed to NUMBER on DATE. Request form also sent to scanning to be scanned to patient chart.       Company: [employer]   Number: [phone]  Fax: [phone]    Date 6/15/2022

## 2022-06-16 ENCOUNTER — LAB ENCOUNTER (OUTPATIENT)
Dept: LAB | Age: 72
End: 2022-06-16
Attending: FAMILY MEDICINE
Payer: MEDICARE

## 2022-06-16 DIAGNOSIS — E11.9 TYPE 2 DIABETES MELLITUS WITHOUT COMPLICATION, WITHOUT LONG-TERM CURRENT USE OF INSULIN (HCC): ICD-10-CM

## 2022-06-16 DIAGNOSIS — E78.00 TYPE 2 DIABETES MELLITUS WITH HYPERCHOLESTEROLEMIA (HCC): Primary | ICD-10-CM

## 2022-06-16 DIAGNOSIS — E11.69 TYPE 2 DIABETES MELLITUS WITH HYPERCHOLESTEROLEMIA (HCC): Primary | ICD-10-CM

## 2022-06-16 DIAGNOSIS — E78.2 HYPERLIPIDEMIA, MIXED: ICD-10-CM

## 2022-06-16 LAB
ALBUMIN SERPL-MCNC: 3.5 G/DL (ref 3.4–5)
ALBUMIN/GLOB SERPL: 1 {RATIO} (ref 1–2)
ALP LIVER SERPL-CCNC: 68 U/L
ALT SERPL-CCNC: 25 U/L
ANION GAP SERPL CALC-SCNC: 6 MMOL/L (ref 0–18)
AST SERPL-CCNC: 14 U/L (ref 15–37)
BILIRUB SERPL-MCNC: 0.5 MG/DL (ref 0.1–2)
BUN BLD-MCNC: 24 MG/DL (ref 7–18)
CALCIUM BLD-MCNC: 9.5 MG/DL (ref 8.5–10.1)
CHLORIDE SERPL-SCNC: 104 MMOL/L (ref 98–112)
CHOLEST SERPL-MCNC: 148 MG/DL (ref ?–200)
CO2 SERPL-SCNC: 27 MMOL/L (ref 21–32)
CREAT BLD-MCNC: 0.73 MG/DL
CREAT UR-SCNC: 49.2 MG/DL
EST. AVERAGE GLUCOSE BLD GHB EST-MCNC: 163 MG/DL (ref 68–126)
FASTING PATIENT LIPID ANSWER: YES
FASTING STATUS PATIENT QL REPORTED: YES
GLOBULIN PLAS-MCNC: 3.4 G/DL (ref 2.8–4.4)
GLUCOSE BLD-MCNC: 118 MG/DL (ref 70–99)
HBA1C MFR BLD: 7.3 % (ref ?–5.7)
HDLC SERPL-MCNC: 60 MG/DL (ref 40–59)
LDLC SERPL CALC-MCNC: 73 MG/DL (ref ?–100)
MICROALBUMIN UR-MCNC: <0.5 MG/DL
NONHDLC SERPL-MCNC: 88 MG/DL (ref ?–130)
OSMOLALITY SERPL CALC.SUM OF ELEC: 289 MOSM/KG (ref 275–295)
POTASSIUM SERPL-SCNC: 4.6 MMOL/L (ref 3.5–5.1)
PROT SERPL-MCNC: 6.9 G/DL (ref 6.4–8.2)
SODIUM SERPL-SCNC: 137 MMOL/L (ref 136–145)
TRIGL SERPL-MCNC: 81 MG/DL (ref 30–149)
VLDLC SERPL CALC-MCNC: 12 MG/DL (ref 0–30)

## 2022-06-16 PROCEDURE — 80053 COMPREHEN METABOLIC PANEL: CPT

## 2022-06-16 PROCEDURE — 80061 LIPID PANEL: CPT

## 2022-06-16 PROCEDURE — 83036 HEMOGLOBIN GLYCOSYLATED A1C: CPT

## 2022-06-16 PROCEDURE — 82043 UR ALBUMIN QUANTITATIVE: CPT

## 2022-06-16 PROCEDURE — 82570 ASSAY OF URINE CREATININE: CPT

## 2022-07-19 ENCOUNTER — LAB ENCOUNTER (OUTPATIENT)
Dept: LAB | Facility: HOSPITAL | Age: 72
End: 2022-07-19
Attending: INTERNAL MEDICINE
Payer: MEDICARE

## 2022-07-19 DIAGNOSIS — Z01.818 PRE-OP TESTING: ICD-10-CM

## 2022-07-19 LAB — SARS-COV-2 RNA RESP QL NAA+PROBE: NOT DETECTED

## 2022-07-22 PROBLEM — R12 HEART BURN: Status: ACTIVE | Noted: 2022-07-22

## 2022-07-22 PROBLEM — Z86.010 PERSONAL HISTORY OF COLONIC POLYPS: Status: ACTIVE | Noted: 2022-07-22

## 2022-07-22 PROBLEM — K21.9 GERD (GASTROESOPHAGEAL REFLUX DISEASE): Status: ACTIVE | Noted: 2022-07-22

## 2022-07-22 PROBLEM — Z86.0100 PERSONAL HISTORY OF COLONIC POLYPS: Status: ACTIVE | Noted: 2022-07-22

## 2022-07-25 ENCOUNTER — NURSE ONLY (OUTPATIENT)
Dept: FAMILY MEDICINE CLINIC | Facility: CLINIC | Age: 72
End: 2022-07-25
Payer: MEDICARE

## 2022-07-25 PROCEDURE — G0010 ADMIN HEPATITIS B VACCINE: HCPCS | Performed by: FAMILY MEDICINE

## 2022-07-25 PROCEDURE — 90746 HEPB VACCINE 3 DOSE ADULT IM: CPT | Performed by: FAMILY MEDICINE

## 2022-07-25 NOTE — PROGRESS NOTES
Pt presents for 2nd Hep B injection. First injection given 06/15/2022. Pt reports no side effects or adverse reactions with previous Hep B administration. Discussed immunization schedule for last dose of series. Provided with VIS. ABN signed. Reinforced possible side effects and conservative management measures, as needed. Advised to contact ofc and speak with triage nurse or provider on call if any severe reactions. Patient voices understanding, no additional questions. Injection given without difficulty and tolerated well by pt.

## 2022-10-12 ENCOUNTER — OFFICE VISIT (OUTPATIENT)
Dept: FAMILY MEDICINE CLINIC | Facility: CLINIC | Age: 72
End: 2022-10-12
Payer: MEDICARE

## 2022-10-12 VITALS
HEART RATE: 66 BPM | WEIGHT: 150 LBS | RESPIRATION RATE: 14 BRPM | HEIGHT: 60 IN | TEMPERATURE: 98 F | SYSTOLIC BLOOD PRESSURE: 116 MMHG | BODY MASS INDEX: 29.45 KG/M2 | DIASTOLIC BLOOD PRESSURE: 70 MMHG

## 2022-10-12 DIAGNOSIS — R12 HEARTBURN: ICD-10-CM

## 2022-10-12 DIAGNOSIS — E78.2 HYPERLIPIDEMIA, MIXED: ICD-10-CM

## 2022-10-12 DIAGNOSIS — R09.81 NASAL CONGESTION: ICD-10-CM

## 2022-10-12 DIAGNOSIS — I65.22 LEFT CAROTID STENOSIS: ICD-10-CM

## 2022-10-12 DIAGNOSIS — E66.9 OBESITY (BMI 30.0-34.9): ICD-10-CM

## 2022-10-12 DIAGNOSIS — E78.00 TYPE 2 DIABETES MELLITUS WITH HYPERCHOLESTEROLEMIA (HCC): ICD-10-CM

## 2022-10-12 DIAGNOSIS — R91.1 NODULE OF MIDDLE LOBE OF RIGHT LUNG: ICD-10-CM

## 2022-10-12 DIAGNOSIS — Z23 NEED FOR VACCINATION: ICD-10-CM

## 2022-10-12 DIAGNOSIS — E11.69 TYPE 2 DIABETES MELLITUS WITH HYPERCHOLESTEROLEMIA (HCC): ICD-10-CM

## 2022-10-12 DIAGNOSIS — E11.9 TYPE 2 DIABETES MELLITUS WITHOUT COMPLICATION, WITHOUT LONG-TERM CURRENT USE OF INSULIN (HCC): Primary | ICD-10-CM

## 2022-10-12 DIAGNOSIS — E55.9 VITAMIN D DEFICIENCY: ICD-10-CM

## 2022-10-12 PROCEDURE — 90662 IIV NO PRSV INCREASED AG IM: CPT | Performed by: FAMILY MEDICINE

## 2022-10-12 PROCEDURE — G0008 ADMIN INFLUENZA VIRUS VAC: HCPCS | Performed by: FAMILY MEDICINE

## 2022-10-12 PROCEDURE — 99214 OFFICE O/P EST MOD 30 MIN: CPT | Performed by: FAMILY MEDICINE

## 2022-10-12 RX ORDER — EMPAGLIFLOZIN 25 MG/1
25 TABLET, FILM COATED ORAL DAILY
Qty: 90 TABLET | Refills: 1 | Status: SHIPPED | OUTPATIENT
Start: 2022-10-12

## 2022-10-12 RX ORDER — ROSUVASTATIN CALCIUM 5 MG/1
5 TABLET, COATED ORAL DAILY
Qty: 90 TABLET | Refills: 1 | Status: SHIPPED | OUTPATIENT
Start: 2022-10-12

## 2022-10-12 RX ORDER — FLUTICASONE PROPIONATE 50 MCG
2 SPRAY, SUSPENSION (ML) NASAL DAILY
Qty: 1 EACH | Refills: 1 | Status: SHIPPED | OUTPATIENT
Start: 2022-10-12 | End: 2023-10-07

## 2022-10-12 RX ORDER — FAMOTIDINE 40 MG/1
40 TABLET, FILM COATED ORAL 2 TIMES DAILY
Qty: 180 TABLET | Refills: 1 | Status: SHIPPED | OUTPATIENT
Start: 2022-10-12

## 2022-10-12 RX ORDER — PIOGLITAZONEHYDROCHLORIDE 30 MG/1
30 TABLET ORAL DAILY
Qty: 90 TABLET | Refills: 1 | Status: SHIPPED | OUTPATIENT
Start: 2022-10-12

## 2022-10-12 RX ORDER — ASPIRIN 81 MG/1
TABLET ORAL
COMMUNITY

## 2022-10-12 RX ORDER — LANCETS 28 GAUGE
EACH MISCELLANEOUS
Qty: 100 EACH | Refills: 2 | Status: SHIPPED | OUTPATIENT
Start: 2022-10-12

## 2022-10-12 NOTE — PATIENT INSTRUCTIONS
Call [phone] to schedule CT chest.   Call [phone] to schedule ultrasound of the carotids. Continue current meds. Watch diet for fats and carbs. Stay active. Forward records from Dr. Maye Mayorga with eye examination.

## 2022-10-13 ENCOUNTER — LAB ENCOUNTER (OUTPATIENT)
Dept: LAB | Age: 72
End: 2022-10-13
Attending: FAMILY MEDICINE
Payer: MEDICARE

## 2022-10-13 DIAGNOSIS — E78.2 HYPERLIPIDEMIA, MIXED: ICD-10-CM

## 2022-10-13 DIAGNOSIS — E11.69 TYPE 2 DIABETES MELLITUS WITH HYPERCHOLESTEROLEMIA (HCC): ICD-10-CM

## 2022-10-13 DIAGNOSIS — N39.0 E. COLI URINARY TRACT INFECTION: ICD-10-CM

## 2022-10-13 DIAGNOSIS — E78.00 TYPE 2 DIABETES MELLITUS WITH HYPERCHOLESTEROLEMIA (HCC): ICD-10-CM

## 2022-10-13 DIAGNOSIS — B96.20 E. COLI URINARY TRACT INFECTION: ICD-10-CM

## 2022-10-13 LAB
ALBUMIN SERPL-MCNC: 3.3 G/DL (ref 3.4–5)
ALBUMIN/GLOB SERPL: 1 {RATIO} (ref 1–2)
ALP LIVER SERPL-CCNC: 79 U/L
ALT SERPL-CCNC: 30 U/L
ANION GAP SERPL CALC-SCNC: 4 MMOL/L (ref 0–18)
AST SERPL-CCNC: 15 U/L (ref 15–37)
BILIRUB SERPL-MCNC: 0.4 MG/DL (ref 0.1–2)
BUN BLD-MCNC: 16 MG/DL (ref 7–18)
CALCIUM BLD-MCNC: 9.3 MG/DL (ref 8.5–10.1)
CHLORIDE SERPL-SCNC: 108 MMOL/L (ref 98–112)
CHOLEST SERPL-MCNC: 153 MG/DL (ref ?–200)
CO2 SERPL-SCNC: 27 MMOL/L (ref 21–32)
CREAT BLD-MCNC: 0.63 MG/DL
CREAT UR-SCNC: 65.6 MG/DL
EST. AVERAGE GLUCOSE BLD GHB EST-MCNC: 163 MG/DL (ref 68–126)
FASTING PATIENT LIPID ANSWER: YES
FASTING STATUS PATIENT QL REPORTED: YES
GFR SERPLBLD BASED ON 1.73 SQ M-ARVRAT: 94 ML/MIN/1.73M2 (ref 60–?)
GLOBULIN PLAS-MCNC: 3.4 G/DL (ref 2.8–4.4)
GLUCOSE BLD-MCNC: 148 MG/DL (ref 70–99)
HBA1C MFR BLD: 7.3 % (ref ?–5.7)
HDLC SERPL-MCNC: 59 MG/DL (ref 40–59)
LDLC SERPL CALC-MCNC: 75 MG/DL (ref ?–100)
MICROALBUMIN UR-MCNC: <0.5 MG/DL
NONHDLC SERPL-MCNC: 94 MG/DL (ref ?–130)
OSMOLALITY SERPL CALC.SUM OF ELEC: 292 MOSM/KG (ref 275–295)
POTASSIUM SERPL-SCNC: 4.3 MMOL/L (ref 3.5–5.1)
PROT SERPL-MCNC: 6.7 G/DL (ref 6.4–8.2)
SODIUM SERPL-SCNC: 139 MMOL/L (ref 136–145)
TRIGL SERPL-MCNC: 105 MG/DL (ref 30–149)
VLDLC SERPL CALC-MCNC: 16 MG/DL (ref 0–30)

## 2022-10-13 PROCEDURE — 87086 URINE CULTURE/COLONY COUNT: CPT

## 2022-10-13 PROCEDURE — 82570 ASSAY OF URINE CREATININE: CPT

## 2022-10-13 PROCEDURE — 80061 LIPID PANEL: CPT

## 2022-10-13 PROCEDURE — 82043 UR ALBUMIN QUANTITATIVE: CPT

## 2022-10-13 PROCEDURE — 83036 HEMOGLOBIN GLYCOSYLATED A1C: CPT

## 2022-10-13 PROCEDURE — 80053 COMPREHEN METABOLIC PANEL: CPT

## 2022-10-13 PROCEDURE — 36415 COLL VENOUS BLD VENIPUNCTURE: CPT

## 2022-10-14 ENCOUNTER — TELEPHONE (OUTPATIENT)
Dept: FAMILY MEDICINE CLINIC | Facility: CLINIC | Age: 72
End: 2022-10-14

## 2022-10-14 NOTE — TELEPHONE ENCOUNTER
Patient signed medical records request form in office to receive records from [facility]. Request form faxed to [phone] on 10/13/22. Request form also sent to scanning to be scanned to patient chart.

## 2022-10-17 DIAGNOSIS — E11.9 TYPE 2 DIABETES MELLITUS WITHOUT COMPLICATION, WITHOUT LONG-TERM CURRENT USE OF INSULIN (HCC): Primary | ICD-10-CM

## 2022-10-17 DIAGNOSIS — E78.2 HYPERLIPIDEMIA, MIXED: ICD-10-CM

## 2023-02-07 ENCOUNTER — HOSPITAL ENCOUNTER (OUTPATIENT)
Dept: CT IMAGING | Facility: HOSPITAL | Age: 73
Discharge: HOME | End: 2023-02-07
Attending: FAMILY MEDICINE
Payer: MEDICARE

## 2023-02-07 ENCOUNTER — LAB ENCOUNTER (OUTPATIENT)
Dept: LAB | Age: 73
End: 2023-02-07
Attending: FAMILY MEDICINE
Payer: MEDICARE

## 2023-02-07 ENCOUNTER — HOSPITAL ENCOUNTER (OUTPATIENT)
Dept: ULTRASOUND IMAGING | Facility: HOSPITAL | Age: 73
Discharge: HOME | End: 2023-02-07
Attending: FAMILY MEDICINE
Payer: MEDICARE

## 2023-02-07 DIAGNOSIS — E11.9 TYPE 2 DIABETES MELLITUS WITHOUT COMPLICATION, WITHOUT LONG-TERM CURRENT USE OF INSULIN (HCC): ICD-10-CM

## 2023-02-07 DIAGNOSIS — I65.22 LEFT CAROTID STENOSIS: ICD-10-CM

## 2023-02-07 DIAGNOSIS — E78.2 HYPERLIPIDEMIA, MIXED: ICD-10-CM

## 2023-02-07 DIAGNOSIS — R91.1 NODULE OF MIDDLE LOBE OF RIGHT LUNG: ICD-10-CM

## 2023-02-07 LAB
ALBUMIN SERPL-MCNC: 3.7 G/DL (ref 3.4–5)
ALBUMIN/GLOB SERPL: 0.9 {RATIO} (ref 1–2)
ALP LIVER SERPL-CCNC: 76 U/L
ALT SERPL-CCNC: 21 U/L
ANION GAP SERPL CALC-SCNC: 5 MMOL/L (ref 0–18)
AST SERPL-CCNC: 13 U/L (ref 15–37)
BILIRUB SERPL-MCNC: 0.4 MG/DL (ref 0.1–2)
BUN BLD-MCNC: 23 MG/DL (ref 7–18)
CALCIUM BLD-MCNC: 9.7 MG/DL (ref 8.5–10.1)
CHLORIDE SERPL-SCNC: 105 MMOL/L (ref 98–112)
CHOLEST SERPL-MCNC: 160 MG/DL (ref ?–200)
CO2 SERPL-SCNC: 27 MMOL/L (ref 21–32)
CREAT BLD-MCNC: 0.76 MG/DL
CREAT UR-SCNC: 70.8 MG/DL
EST. AVERAGE GLUCOSE BLD GHB EST-MCNC: 174 MG/DL (ref 68–126)
FASTING PATIENT LIPID ANSWER: YES
FASTING STATUS PATIENT QL REPORTED: YES
GFR SERPLBLD BASED ON 1.73 SQ M-ARVRAT: 83 ML/MIN/1.73M2 (ref 60–?)
GLOBULIN PLAS-MCNC: 3.9 G/DL (ref 2.8–4.4)
GLUCOSE BLD-MCNC: 138 MG/DL (ref 70–99)
HBA1C MFR BLD: 7.7 % (ref ?–5.7)
HDLC SERPL-MCNC: 65 MG/DL (ref 40–59)
LDLC SERPL CALC-MCNC: 79 MG/DL (ref ?–100)
MICROALBUMIN UR-MCNC: 0.53 MG/DL
MICROALBUMIN/CREAT 24H UR-RTO: 7.5 UG/MG (ref ?–30)
NONHDLC SERPL-MCNC: 95 MG/DL (ref ?–130)
OSMOLALITY SERPL CALC.SUM OF ELEC: 290 MOSM/KG (ref 275–295)
POTASSIUM SERPL-SCNC: 4.4 MMOL/L (ref 3.5–5.1)
PROT SERPL-MCNC: 7.6 G/DL (ref 6.4–8.2)
SODIUM SERPL-SCNC: 137 MMOL/L (ref 136–145)
TRIGL SERPL-MCNC: 87 MG/DL (ref 30–149)
VLDLC SERPL CALC-MCNC: 14 MG/DL (ref 0–30)

## 2023-02-07 PROCEDURE — 93880 EXTRACRANIAL BILAT STUDY: CPT | Performed by: FAMILY MEDICINE

## 2023-02-07 PROCEDURE — 82043 UR ALBUMIN QUANTITATIVE: CPT

## 2023-02-07 PROCEDURE — 71250 CT THORAX DX C-: CPT | Performed by: FAMILY MEDICINE

## 2023-02-07 PROCEDURE — 82570 ASSAY OF URINE CREATININE: CPT

## 2023-02-07 PROCEDURE — 83036 HEMOGLOBIN GLYCOSYLATED A1C: CPT

## 2023-02-07 PROCEDURE — 80061 LIPID PANEL: CPT

## 2023-02-07 PROCEDURE — 36415 COLL VENOUS BLD VENIPUNCTURE: CPT

## 2023-02-07 PROCEDURE — 80053 COMPREHEN METABOLIC PANEL: CPT

## 2023-02-10 ENCOUNTER — OFFICE VISIT (OUTPATIENT)
Dept: FAMILY MEDICINE CLINIC | Facility: CLINIC | Age: 73
End: 2023-02-10
Payer: MEDICARE

## 2023-02-10 ENCOUNTER — HOSPITAL ENCOUNTER (OUTPATIENT)
Dept: GENERAL RADIOLOGY | Age: 73
Discharge: HOME | End: 2023-02-10
Attending: FAMILY MEDICINE
Payer: MEDICARE

## 2023-02-10 ENCOUNTER — LAB ENCOUNTER (OUTPATIENT)
Dept: LAB | Facility: HOSPITAL | Age: 73
End: 2023-02-10
Attending: FAMILY MEDICINE
Payer: MEDICARE

## 2023-02-10 VITALS
SYSTOLIC BLOOD PRESSURE: 112 MMHG | WEIGHT: 145 LBS | BODY MASS INDEX: 28.47 KG/M2 | HEART RATE: 66 BPM | DIASTOLIC BLOOD PRESSURE: 68 MMHG | TEMPERATURE: 98 F | HEIGHT: 60 IN | RESPIRATION RATE: 18 BRPM

## 2023-02-10 DIAGNOSIS — I65.22 LEFT CAROTID STENOSIS: ICD-10-CM

## 2023-02-10 DIAGNOSIS — E78.2 HYPERLIPIDEMIA, MIXED: ICD-10-CM

## 2023-02-10 DIAGNOSIS — E55.9 VITAMIN D DEFICIENCY: ICD-10-CM

## 2023-02-10 DIAGNOSIS — E78.00 TYPE 2 DIABETES MELLITUS WITH HYPERCHOLESTEROLEMIA (HCC): ICD-10-CM

## 2023-02-10 DIAGNOSIS — R93.89 ABNORMAL CT OF THE CHEST: ICD-10-CM

## 2023-02-10 DIAGNOSIS — R09.81 NASAL CONGESTION: ICD-10-CM

## 2023-02-10 DIAGNOSIS — R12 HEARTBURN: ICD-10-CM

## 2023-02-10 DIAGNOSIS — E11.649 UNCONTROLLED TYPE 2 DIABETES MELLITUS WITH HYPOGLYCEMIA WITHOUT COMA (HCC): Primary | ICD-10-CM

## 2023-02-10 DIAGNOSIS — M25.562 LEFT KNEE PAIN, UNSPECIFIED CHRONICITY: ICD-10-CM

## 2023-02-10 DIAGNOSIS — E11.69 TYPE 2 DIABETES MELLITUS WITH HYPERCHOLESTEROLEMIA (HCC): ICD-10-CM

## 2023-02-10 PROCEDURE — 99215 OFFICE O/P EST HI 40 MIN: CPT | Performed by: FAMILY MEDICINE

## 2023-02-10 PROCEDURE — 73564 X-RAY EXAM KNEE 4 OR MORE: CPT | Performed by: FAMILY MEDICINE

## 2023-02-10 RX ORDER — ROSUVASTATIN CALCIUM 5 MG/1
5 TABLET, COATED ORAL DAILY
Qty: 90 TABLET | Refills: 1 | Status: SHIPPED | OUTPATIENT
Start: 2023-02-10

## 2023-02-10 RX ORDER — FLUTICASONE PROPIONATE 50 MCG
2 SPRAY, SUSPENSION (ML) NASAL DAILY
Qty: 3 EACH | Refills: 1 | Status: SHIPPED | OUTPATIENT
Start: 2023-02-10 | End: 2024-02-05

## 2023-02-10 RX ORDER — PIOGLITAZONEHYDROCHLORIDE 30 MG/1
30 TABLET ORAL DAILY
Qty: 90 TABLET | Refills: 1 | Status: SHIPPED | OUTPATIENT
Start: 2023-02-10

## 2023-02-10 RX ORDER — EMPAGLIFLOZIN 25 MG/1
25 TABLET, FILM COATED ORAL DAILY
Qty: 90 TABLET | Refills: 1 | Status: SHIPPED | OUTPATIENT
Start: 2023-02-10

## 2023-02-10 RX ORDER — LANCETS 28 GAUGE
EACH MISCELLANEOUS
Qty: 100 EACH | Refills: 2 | Status: CANCELLED | OUTPATIENT
Start: 2023-02-10

## 2023-02-10 NOTE — PATIENT INSTRUCTIONS
Do x-ray of your knee. Continue current medications watch diet for sugars and carbohydrates-have less bread, less pasta less rice less potatoes and sweets. Healthy diet. Increase activity. Do fasting blood work after May 7th. Ffollow-up in the office for Medicare wellness visit/medication check.

## 2023-02-11 LAB — SARS-COV-2 RNA RESP QL NAA+PROBE: NOT DETECTED

## 2023-02-13 ENCOUNTER — RT VISIT (OUTPATIENT)
Dept: RESPIRATORY THERAPY | Facility: HOSPITAL | Age: 73
End: 2023-02-13
Attending: FAMILY MEDICINE
Payer: MEDICARE

## 2023-02-13 DIAGNOSIS — R93.89 ABNORMAL CT OF THE CHEST: ICD-10-CM

## 2023-02-13 PROCEDURE — 94726 PLETHYSMOGRAPHY LUNG VOLUMES: CPT

## 2023-02-13 PROCEDURE — 94729 DIFFUSING CAPACITY: CPT

## 2023-02-13 PROCEDURE — 94010 BREATHING CAPACITY TEST: CPT

## 2023-02-15 ENCOUNTER — TELEPHONE (OUTPATIENT)
Dept: FAMILY MEDICINE CLINIC | Facility: CLINIC | Age: 73
End: 2023-02-15

## 2023-02-15 NOTE — TELEPHONE ENCOUNTER
LM if patient is not a permanent resident of [place] that is almost time for MAW.   Disregard call or mychart us if she's no returning

## 2023-02-16 ENCOUNTER — MED REC SCAN ONLY (OUTPATIENT)
Dept: FAMILY MEDICINE CLINIC | Facility: CLINIC | Age: 73
End: 2023-02-16

## 2023-02-18 NOTE — PROCEDURES
Patient is a 71-year-old female being assessed with a diagnosis of abnormal CT scan. Results--patient's FEV1 is 1.67 L normal at 92% of predicted with a FVC of 1.83 L mildly reduced 78% of predicted for normal ratio at 91%.   MVV is supranormal at 110% of predicted  Total lung capacity 3.62 L normal at 85% of predicted with a residual volume 1.76 L normal at 91% of predicted  Diffusion capacity is normal 91% of predicted corrects to 101% of predicted    Impression --essentially normal study-    No prior studies found

## 2023-10-26 ENCOUNTER — LAB ENCOUNTER (OUTPATIENT)
Dept: LAB | Age: 73
End: 2023-10-26
Attending: FAMILY MEDICINE

## 2023-10-26 DIAGNOSIS — E78.00 TYPE 2 DIABETES MELLITUS WITH HYPERCHOLESTEROLEMIA: ICD-10-CM

## 2023-10-26 DIAGNOSIS — E11.649 UNCONTROLLED TYPE 2 DIABETES MELLITUS WITH HYPOGLYCEMIA WITHOUT COMA (HCC): ICD-10-CM

## 2023-10-26 DIAGNOSIS — E78.2 HYPERLIPIDEMIA, MIXED: ICD-10-CM

## 2023-10-26 DIAGNOSIS — R12 HEARTBURN: ICD-10-CM

## 2023-10-26 DIAGNOSIS — E11.69 TYPE 2 DIABETES MELLITUS WITH HYPERCHOLESTEROLEMIA: ICD-10-CM

## 2023-10-26 DIAGNOSIS — E55.9 VITAMIN D DEFICIENCY: ICD-10-CM

## 2023-10-26 LAB
ALBUMIN SERPL-MCNC: 3.6 G/DL (ref 3.4–5)
ALBUMIN/GLOB SERPL: 0.9 {RATIO} (ref 1–2)
ALP LIVER SERPL-CCNC: 80 U/L
ALT SERPL-CCNC: 35 U/L
ANION GAP SERPL CALC-SCNC: 6 MMOL/L (ref 0–18)
AST SERPL-CCNC: 22 U/L (ref 15–37)
BASOPHILS # BLD AUTO: 0.07 X10(3) UL (ref 0–0.2)
BASOPHILS NFR BLD AUTO: 1.1 %
BILIRUB SERPL-MCNC: 0.5 MG/DL (ref 0.1–2)
BILIRUB UR QL STRIP.AUTO: NEGATIVE
BUN BLD-MCNC: 19 MG/DL (ref 7–18)
CALCIUM BLD-MCNC: 9.7 MG/DL (ref 8.5–10.1)
CHLORIDE SERPL-SCNC: 107 MMOL/L (ref 98–112)
CHOLEST SERPL-MCNC: 198 MG/DL (ref ?–200)
CLARITY UR REFRACT.AUTO: CLEAR
CO2 SERPL-SCNC: 27 MMOL/L (ref 21–32)
CREAT BLD-MCNC: 0.75 MG/DL
CREAT UR-SCNC: 75.2 MG/DL
EGFRCR SERPLBLD CKD-EPI 2021: 83 ML/MIN/1.73M2 (ref 60–?)
EOSINOPHIL # BLD AUTO: 0.25 X10(3) UL (ref 0–0.7)
EOSINOPHIL NFR BLD AUTO: 4 %
ERYTHROCYTE [DISTWIDTH] IN BLOOD BY AUTOMATED COUNT: 15.5 %
EST. AVERAGE GLUCOSE BLD GHB EST-MCNC: 186 MG/DL (ref 68–126)
FASTING PATIENT LIPID ANSWER: YES
FASTING STATUS PATIENT QL REPORTED: YES
GLOBULIN PLAS-MCNC: 3.8 G/DL (ref 2.8–4.4)
GLUCOSE BLD-MCNC: 170 MG/DL (ref 70–99)
GLUCOSE UR STRIP.AUTO-MCNC: >1000 MG/DL
HBA1C MFR BLD: 8.1 % (ref ?–5.7)
HCT VFR BLD AUTO: 45.6 %
HDLC SERPL-MCNC: 73 MG/DL (ref 40–59)
HGB BLD-MCNC: 14.7 G/DL
IMM GRANULOCYTES # BLD AUTO: 0.04 X10(3) UL (ref 0–1)
IMM GRANULOCYTES NFR BLD: 0.6 %
KETONES UR STRIP.AUTO-MCNC: NEGATIVE MG/DL
LDLC SERPL CALC-MCNC: 108 MG/DL (ref ?–100)
LEUKOCYTE ESTERASE UR QL STRIP.AUTO: NEGATIVE
LYMPHOCYTES # BLD AUTO: 1.61 X10(3) UL (ref 1–4)
LYMPHOCYTES NFR BLD AUTO: 26 %
MCH RBC QN AUTO: 29.2 PG (ref 26–34)
MCHC RBC AUTO-ENTMCNC: 32.2 G/DL (ref 31–37)
MCV RBC AUTO: 90.7 FL
MICROALBUMIN UR-MCNC: 0.75 MG/DL
MICROALBUMIN/CREAT 24H UR-RTO: 10 UG/MG (ref ?–30)
MONOCYTES # BLD AUTO: 0.61 X10(3) UL (ref 0.1–1)
MONOCYTES NFR BLD AUTO: 9.8 %
NEUTROPHILS # BLD AUTO: 3.62 X10 (3) UL (ref 1.5–7.7)
NEUTROPHILS # BLD AUTO: 3.62 X10(3) UL (ref 1.5–7.7)
NEUTROPHILS NFR BLD AUTO: 58.5 %
NITRITE UR QL STRIP.AUTO: NEGATIVE
NONHDLC SERPL-MCNC: 125 MG/DL (ref ?–130)
OSMOLALITY SERPL CALC.SUM OF ELEC: 296 MOSM/KG (ref 275–295)
PH UR STRIP.AUTO: 5 [PH] (ref 5–8)
PLATELET # BLD AUTO: 280 10(3)UL (ref 150–450)
POTASSIUM SERPL-SCNC: 4.3 MMOL/L (ref 3.5–5.1)
PROT SERPL-MCNC: 7.4 G/DL (ref 6.4–8.2)
PROT UR STRIP.AUTO-MCNC: NEGATIVE MG/DL
RBC # BLD AUTO: 5.03 X10(6)UL
RBC UR QL AUTO: NEGATIVE
SODIUM SERPL-SCNC: 140 MMOL/L (ref 136–145)
SP GR UR STRIP.AUTO: >1.03 (ref 1–1.03)
TRIGL SERPL-MCNC: 93 MG/DL (ref 30–149)
TSI SER-ACNC: 1.88 MIU/ML (ref 0.36–3.74)
UROBILINOGEN UR STRIP.AUTO-MCNC: NORMAL MG/DL
VLDLC SERPL CALC-MCNC: 16 MG/DL (ref 0–30)
WBC # BLD AUTO: 6.2 X10(3) UL (ref 4–11)

## 2023-10-26 PROCEDURE — 81003 URINALYSIS AUTO W/O SCOPE: CPT

## 2023-10-26 PROCEDURE — 84443 ASSAY THYROID STIM HORMONE: CPT

## 2023-10-26 PROCEDURE — 36415 COLL VENOUS BLD VENIPUNCTURE: CPT

## 2023-10-26 PROCEDURE — 82570 ASSAY OF URINE CREATININE: CPT

## 2023-10-26 PROCEDURE — 80061 LIPID PANEL: CPT

## 2023-10-26 PROCEDURE — 85025 COMPLETE CBC W/AUTO DIFF WBC: CPT

## 2023-10-26 PROCEDURE — 80053 COMPREHEN METABOLIC PANEL: CPT

## 2023-10-26 PROCEDURE — 83036 HEMOGLOBIN GLYCOSYLATED A1C: CPT

## 2023-10-26 PROCEDURE — 82043 UR ALBUMIN QUANTITATIVE: CPT

## 2023-10-26 PROCEDURE — 82306 VITAMIN D 25 HYDROXY: CPT

## 2023-10-27 LAB — VIT D+METAB SERPL-MCNC: 25.9 NG/ML (ref 30–100)

## 2023-10-30 ENCOUNTER — OFFICE VISIT (OUTPATIENT)
Dept: FAMILY MEDICINE CLINIC | Facility: CLINIC | Age: 73
End: 2023-10-30

## 2023-10-30 ENCOUNTER — TELEPHONE (OUTPATIENT)
Dept: FAMILY MEDICINE CLINIC | Facility: CLINIC | Age: 73
End: 2023-10-30

## 2023-10-30 VITALS
TEMPERATURE: 98 F | OXYGEN SATURATION: 98 % | WEIGHT: 150 LBS | HEART RATE: 86 BPM | BODY MASS INDEX: 29.45 KG/M2 | DIASTOLIC BLOOD PRESSURE: 80 MMHG | HEIGHT: 60 IN | SYSTOLIC BLOOD PRESSURE: 134 MMHG | RESPIRATION RATE: 20 BRPM

## 2023-10-30 DIAGNOSIS — I65.22 LEFT CAROTID STENOSIS: ICD-10-CM

## 2023-10-30 DIAGNOSIS — E78.2 HYPERLIPIDEMIA, MIXED: ICD-10-CM

## 2023-10-30 DIAGNOSIS — E11.9 TYPE 2 DIABETES MELLITUS WITHOUT COMPLICATION, WITHOUT LONG-TERM CURRENT USE OF INSULIN (HCC): ICD-10-CM

## 2023-10-30 DIAGNOSIS — E11.649 UNCONTROLLED TYPE 2 DIABETES MELLITUS WITH HYPOGLYCEMIA WITHOUT COMA (HCC): Primary | ICD-10-CM

## 2023-10-30 DIAGNOSIS — E55.9 VITAMIN D DEFICIENCY: ICD-10-CM

## 2023-10-30 DIAGNOSIS — E78.00 TYPE 2 DIABETES MELLITUS WITH HYPERCHOLESTEROLEMIA: ICD-10-CM

## 2023-10-30 DIAGNOSIS — E11.69 TYPE 2 DIABETES MELLITUS WITH HYPERCHOLESTEROLEMIA: ICD-10-CM

## 2023-10-30 RX ORDER — IBUPROFEN 800 MG/1
TABLET ORAL
COMMUNITY
Start: 2023-09-21 | End: 2023-10-30 | Stop reason: ALTCHOICE

## 2023-10-30 RX ORDER — EMPAGLIFLOZIN 25 MG/1
25 TABLET, FILM COATED ORAL DAILY
Qty: 90 TABLET | Refills: 0 | Status: SHIPPED | OUTPATIENT
Start: 2023-10-30

## 2023-10-30 RX ORDER — LOSARTAN POTASSIUM 25 MG/1
TABLET ORAL
COMMUNITY
Start: 2023-09-21

## 2023-10-30 RX ORDER — ERGOCALCIFEROL 1.25 MG/1
50000 CAPSULE ORAL WEEKLY
Qty: 12 CAPSULE | Refills: 0 | Status: SHIPPED | OUTPATIENT
Start: 2023-10-30 | End: 2024-01-16

## 2023-10-30 RX ORDER — AMOXICILLIN AND CLAVULANATE POTASSIUM 875; 125 MG/1; MG/1
1 TABLET, FILM COATED ORAL EVERY 12 HOURS
COMMUNITY
Start: 2023-05-25 | End: 2023-10-30 | Stop reason: ALTCHOICE

## 2023-10-30 RX ORDER — PIOGLITAZONEHYDROCHLORIDE 30 MG/1
30 TABLET ORAL DAILY
Qty: 90 TABLET | Refills: 0 | Status: SHIPPED | OUTPATIENT
Start: 2023-10-30

## 2023-10-30 RX ORDER — CETIRIZINE HYDROCHLORIDE 10 MG/1
TABLET ORAL
COMMUNITY
Start: 2023-09-21 | End: 2023-10-30 | Stop reason: ALTCHOICE

## 2023-10-30 RX ORDER — ROSUVASTATIN CALCIUM 10 MG/1
10 TABLET, COATED ORAL DAILY
Qty: 90 TABLET | Refills: 0 | Status: SHIPPED | OUTPATIENT
Start: 2023-10-30

## 2023-10-30 RX ORDER — ORAL SEMAGLUTIDE 3 MG/1
3 TABLET ORAL DAILY
Qty: 30 TABLET | Refills: 0 | Status: SHIPPED | OUTPATIENT
Start: 2023-10-30 | End: 2023-11-29

## 2023-10-30 RX ORDER — DEXTROMETHORPHAN HYDROBROMIDE AND PROMETHAZINE HYDROCHLORIDE 15; 6.25 MG/5ML; MG/5ML
5 SYRUP ORAL EVERY 6 HOURS PRN
COMMUNITY
Start: 2023-05-25 | End: 2023-10-30 | Stop reason: ALTCHOICE

## 2023-10-30 NOTE — TELEPHONE ENCOUNTER
Received an order to call Dr. Laura Cloud office to ask for a copy of patient's diabetic eye exam from this year. Called and they voiced understanding. They said they will fax it today at [phone].

## 2023-10-30 NOTE — PATIENT INSTRUCTIONS
Start Rybelsus 3mg daily. Take at least 30 minutes before food or other medication. Call in 3-4 weeks with update on how you are doing with this medication. We will increase dose of Rybelsus at that time. Increase dose of Rosuvastatin to 10mg nightly. Start Vitamin D 50,000 international units once weekly for 12 weeks. Once completed, start Vitamin D 2,000 international units daily as a maintenance dose. Repeat blood work after 1/27/24. Low fat, low carb diet. Regular exercise. Reschedule appointment on 1/16/24 with  to be after February 1st 2024.

## 2023-11-01 ENCOUNTER — MED REC SCAN ONLY (OUTPATIENT)
Dept: FAMILY MEDICINE CLINIC | Facility: CLINIC | Age: 73
End: 2023-11-01

## 2023-11-06 ENCOUNTER — TELEPHONE (OUTPATIENT)
Dept: FAMILY MEDICINE CLINIC | Facility: CLINIC | Age: 73
End: 2023-11-06

## 2023-11-06 NOTE — TELEPHONE ENCOUNTER
PT son dropped off prior auth for to be filled out. Placed at [employer]. Thank you.

## 2023-11-06 NOTE — TELEPHONE ENCOUNTER
Completed Prior Authorization form signed by   Eugenia Daniel Per Pt request from placed at the  for . details…

## 2023-11-24 ENCOUNTER — HOSPITAL ENCOUNTER (OUTPATIENT)
Age: 73
Discharge: HOME | End: 2023-11-24
Payer: MEDICARE

## 2023-11-24 ENCOUNTER — APPOINTMENT (OUTPATIENT)
Dept: GENERAL RADIOLOGY | Age: 73
End: 2023-11-24
Attending: PHYSICIAN ASSISTANT
Payer: MEDICARE

## 2023-11-24 VITALS
BODY MASS INDEX: 29 KG/M2 | OXYGEN SATURATION: 96 % | DIASTOLIC BLOOD PRESSURE: 66 MMHG | SYSTOLIC BLOOD PRESSURE: 136 MMHG | TEMPERATURE: 99 F | WEIGHT: 146 LBS | HEART RATE: 100 BPM | RESPIRATION RATE: 20 BRPM

## 2023-11-24 DIAGNOSIS — J10.1 INFLUENZA A: Primary | ICD-10-CM

## 2023-11-24 DIAGNOSIS — R06.2 WHEEZING: ICD-10-CM

## 2023-11-24 LAB
POCT INFLUENZA A: POSITIVE
POCT INFLUENZA B: NEGATIVE
SARS-COV-2 RNA RESP QL NAA+PROBE: NOT DETECTED

## 2023-11-24 PROCEDURE — 99214 OFFICE O/P EST MOD 30 MIN: CPT

## 2023-11-24 PROCEDURE — 87502 INFLUENZA DNA AMP PROBE: CPT | Performed by: PHYSICIAN ASSISTANT

## 2023-11-24 PROCEDURE — 99204 OFFICE O/P NEW MOD 45 MIN: CPT

## 2023-11-24 PROCEDURE — 71046 X-RAY EXAM CHEST 2 VIEWS: CPT | Performed by: PHYSICIAN ASSISTANT

## 2023-11-24 PROCEDURE — 94640 AIRWAY INHALATION TREATMENT: CPT

## 2023-11-24 RX ORDER — PREDNISONE 20 MG/1
60 TABLET ORAL ONCE
Status: COMPLETED | OUTPATIENT
Start: 2023-11-24 | End: 2023-11-24

## 2023-11-24 RX ORDER — ALBUTEROL SULFATE 90 UG/1
2 AEROSOL, METERED RESPIRATORY (INHALATION) EVERY 4 HOURS PRN
Qty: 1 EACH | Refills: 0 | Status: SHIPPED | OUTPATIENT
Start: 2023-11-24 | End: 2023-12-24

## 2023-11-24 RX ORDER — IPRATROPIUM BROMIDE AND ALBUTEROL SULFATE 2.5; .5 MG/3ML; MG/3ML
3 SOLUTION RESPIRATORY (INHALATION) ONCE
Status: COMPLETED | OUTPATIENT
Start: 2023-11-24 | End: 2023-11-24

## 2023-11-24 RX ORDER — PREDNISONE 20 MG/1
40 TABLET ORAL DAILY
Qty: 8 TABLET | Refills: 0 | Status: SHIPPED | OUTPATIENT
Start: 2023-11-25 | End: 2023-11-29

## 2023-11-24 RX ORDER — BENZONATATE 200 MG/1
200 CAPSULE ORAL 3 TIMES DAILY PRN
Qty: 30 CAPSULE | Refills: 0 | Status: SHIPPED | OUTPATIENT
Start: 2023-11-24 | End: 2023-12-01

## 2023-11-24 NOTE — DISCHARGE INSTRUCTIONS
Start the oral steroids tomorrow as you received your first dose here today you will need to monitor your blood sugars closely as this will raise your blood sugar stick to a very low carbohydrate and sugar diet over the next few days    tessalon Perles as needed for cough    Inhaler with spacer every 4-6 hours  Encouraged pushing oral fluids and rest    OTC Tylenol/Ibuprofen alternate every 4 hours  as needed for fever/aches    Follow up with PCP if symptoms worsen or persist ir fever over 5-7 days     If ever spiking fevers that are not controlled by tylenol or motrin, shortness of breath, chest pain, or signs of dehydration such as decreased urine output or dry mucous membranes go to ER

## 2023-11-24 NOTE — ED INITIAL ASSESSMENT (HPI)
Cough, congestion, difficulty breathing, generalized body aches, headaches since Monday. Had flu vaccine Monday morning. Tylenol last overnight.

## 2023-12-04 ENCOUNTER — TELEPHONE (OUTPATIENT)
Dept: FAMILY MEDICINE CLINIC | Facility: CLINIC | Age: 73
End: 2023-12-04

## 2023-12-04 NOTE — TELEPHONE ENCOUNTER
Olivia to use a hospital follow-up spot for her for her hospital follow-up visit. Bring the records for that visit.   Thank you

## 2023-12-04 NOTE — TELEPHONE ENCOUNTER
Appt made for pt to follow up with Dr. Cole Sosa for 12/08 at 11:00 am. Pt. Agreed to plan and verbalized understanding

## 2023-12-04 NOTE — TELEPHONE ENCOUNTER
Pt states she had the flu the kenna week or so and now her diabetes medication is off. Pt would like apt to get back on track. Please advise.

## 2023-12-04 NOTE — TELEPHONE ENCOUNTER
Called Pt and she states that she was sick since Thanksgiving 11/23/23. Went to [facility] 11/24/23 and was tested positive for Flu. She states that while traveling for holidays, she was hospitalized and admitted at ICU at [facility], [address] from 11/27/23- 11/30/23 because of breathing issues. Pt states she was put on steroids causing his diabetes to be uncontrolled so there are changes with diabetic medication. Pt states she will drop off hospitalization notes to our office today. Pt wants a HFU appointment with Dr. Shanta Savage. Please advise when to schedule. If not able to see soon, Pt is okay to schedule with Srikanth BEARDEN.

## 2023-12-08 ENCOUNTER — LAB ENCOUNTER (OUTPATIENT)
Dept: LAB | Age: 73
End: 2023-12-08
Attending: FAMILY MEDICINE
Payer: MEDICARE

## 2023-12-08 ENCOUNTER — OFFICE VISIT (OUTPATIENT)
Dept: FAMILY MEDICINE CLINIC | Facility: CLINIC | Age: 73
End: 2023-12-08
Payer: MEDICARE

## 2023-12-08 VITALS
OXYGEN SATURATION: 98 % | HEART RATE: 86 BPM | HEIGHT: 60 IN | DIASTOLIC BLOOD PRESSURE: 70 MMHG | WEIGHT: 140.38 LBS | BODY MASS INDEX: 27.56 KG/M2 | TEMPERATURE: 98 F | RESPIRATION RATE: 18 BRPM | SYSTOLIC BLOOD PRESSURE: 124 MMHG

## 2023-12-08 DIAGNOSIS — Z87.09 HISTORY OF INFLUENZA: ICD-10-CM

## 2023-12-08 DIAGNOSIS — J18.9 COMMUNITY ACQUIRED PNEUMONIA, UNSPECIFIED LATERALITY: ICD-10-CM

## 2023-12-08 DIAGNOSIS — E78.2 HYPERLIPIDEMIA, MIXED: ICD-10-CM

## 2023-12-08 DIAGNOSIS — E11.65 UNCONTROLLED TYPE 2 DIABETES MELLITUS WITH HYPERGLYCEMIA, WITHOUT LONG-TERM CURRENT USE OF INSULIN (HCC): ICD-10-CM

## 2023-12-08 DIAGNOSIS — E11.10 DIABETIC KETOACIDOSIS WITHOUT COMA ASSOCIATED WITH TYPE 2 DIABETES MELLITUS (HCC): ICD-10-CM

## 2023-12-08 DIAGNOSIS — R39.15 URINARY URGENCY: ICD-10-CM

## 2023-12-08 DIAGNOSIS — Z86.39 HISTORY OF DIABETIC KETOACIDOSIS: Primary | ICD-10-CM

## 2023-12-08 LAB
ALBUMIN SERPL-MCNC: 3.3 G/DL (ref 3.4–5)
ALBUMIN/GLOB SERPL: 0.8 {RATIO} (ref 1–2)
ALP LIVER SERPL-CCNC: 84 U/L
ALT SERPL-CCNC: 35 U/L
ANION GAP SERPL CALC-SCNC: 6 MMOL/L (ref 0–18)
AST SERPL-CCNC: 15 U/L (ref 15–37)
BASOPHILS # BLD AUTO: 0.06 X10(3) UL (ref 0–0.2)
BASOPHILS NFR BLD AUTO: 0.7 %
BILIRUB SERPL-MCNC: 0.4 MG/DL (ref 0.1–2)
BILIRUB UR QL STRIP.AUTO: NEGATIVE
BUN BLD-MCNC: 16 MG/DL (ref 9–23)
CALCIUM BLD-MCNC: 9.5 MG/DL (ref 8.5–10.1)
CHLORIDE SERPL-SCNC: 104 MMOL/L (ref 98–112)
CLARITY UR REFRACT.AUTO: CLEAR
CO2 SERPL-SCNC: 28 MMOL/L (ref 21–32)
CREAT BLD-MCNC: 0.54 MG/DL
EGFRCR SERPLBLD CKD-EPI 2021: 97 ML/MIN/1.73M2 (ref 60–?)
EOSINOPHIL # BLD AUTO: 0.1 X10(3) UL (ref 0–0.7)
EOSINOPHIL NFR BLD AUTO: 1.1 %
ERYTHROCYTE [DISTWIDTH] IN BLOOD BY AUTOMATED COUNT: 15.8 %
FASTING STATUS PATIENT QL REPORTED: YES
GLOBULIN PLAS-MCNC: 4.1 G/DL (ref 2.8–4.4)
GLUCOSE BLD-MCNC: 147 MG/DL (ref 70–99)
GLUCOSE UR STRIP.AUTO-MCNC: 70 MG/DL
HCT VFR BLD AUTO: 44.4 %
HGB BLD-MCNC: 13.7 G/DL
IMM GRANULOCYTES # BLD AUTO: 0.08 X10(3) UL (ref 0–1)
IMM GRANULOCYTES NFR BLD: 0.9 %
KETONES UR STRIP.AUTO-MCNC: NEGATIVE MG/DL
LEUKOCYTE ESTERASE UR QL STRIP.AUTO: 25
LYMPHOCYTES # BLD AUTO: 1.84 X10(3) UL (ref 1–4)
LYMPHOCYTES NFR BLD AUTO: 20.6 %
MCH RBC QN AUTO: 29 PG (ref 26–34)
MCHC RBC AUTO-ENTMCNC: 30.9 G/DL (ref 31–37)
MCV RBC AUTO: 93.9 FL
MONOCYTES # BLD AUTO: 0.87 X10(3) UL (ref 0.1–1)
MONOCYTES NFR BLD AUTO: 9.7 %
NEUTROPHILS # BLD AUTO: 6 X10 (3) UL (ref 1.5–7.7)
NEUTROPHILS # BLD AUTO: 6 X10(3) UL (ref 1.5–7.7)
NEUTROPHILS NFR BLD AUTO: 67 %
NITRITE UR QL STRIP.AUTO: NEGATIVE
OSMOLALITY SERPL CALC.SUM OF ELEC: 290 MOSM/KG (ref 275–295)
PH UR STRIP.AUTO: 5 [PH] (ref 5–8)
PLATELET # BLD AUTO: 479 10(3)UL (ref 150–450)
POTASSIUM SERPL-SCNC: 3.9 MMOL/L (ref 3.5–5.1)
PROT SERPL-MCNC: 7.4 G/DL (ref 6.4–8.2)
PROT UR STRIP.AUTO-MCNC: NEGATIVE MG/DL
RBC # BLD AUTO: 4.73 X10(6)UL
RBC UR QL AUTO: NEGATIVE
SODIUM SERPL-SCNC: 138 MMOL/L (ref 136–145)
SP GR UR STRIP.AUTO: 1.01 (ref 1–1.03)
UROBILINOGEN UR STRIP.AUTO-MCNC: NORMAL MG/DL
WBC # BLD AUTO: 9 X10(3) UL (ref 4–11)

## 2023-12-08 PROCEDURE — 80053 COMPREHEN METABOLIC PANEL: CPT

## 2023-12-08 PROCEDURE — 81001 URINALYSIS AUTO W/SCOPE: CPT

## 2023-12-08 PROCEDURE — 99215 OFFICE O/P EST HI 40 MIN: CPT | Performed by: FAMILY MEDICINE

## 2023-12-08 PROCEDURE — 87086 URINE CULTURE/COLONY COUNT: CPT

## 2023-12-08 PROCEDURE — 85025 COMPLETE CBC W/AUTO DIFF WBC: CPT

## 2023-12-08 PROCEDURE — 1111F DSCHRG MED/CURRENT MED MERGE: CPT | Performed by: FAMILY MEDICINE

## 2023-12-08 NOTE — PATIENT INSTRUCTIONS
Do not take Jardiance. Continue other medications. Keep good hydration. Do blood work today. Further recommendation pending test results.

## 2023-12-11 DIAGNOSIS — Z86.39 HISTORY OF DIABETIC KETOACIDOSIS: Primary | ICD-10-CM

## 2023-12-18 DIAGNOSIS — E11.65 UNCONTROLLED TYPE 2 DIABETES MELLITUS WITH HYPERGLYCEMIA, WITHOUT LONG-TERM CURRENT USE OF INSULIN (HCC): ICD-10-CM

## 2023-12-18 RX ORDER — ORAL SEMAGLUTIDE 3 MG/1
3 TABLET ORAL DAILY
Qty: 30 TABLET | Refills: 0 | Status: SHIPPED | OUTPATIENT
Start: 2023-12-18 | End: 2024-01-17

## 2023-12-18 NOTE — TELEPHONE ENCOUNTER
PT WOULD LIKE REFILL OF Semaglutide (RYBELSUS) 3 MG Oral Tab sent to [place] [address] [address] [place] [address] [facility], [phone], [phone] [[id number]]  thank you.

## 2023-12-18 NOTE — TELEPHONE ENCOUNTER
LOV:12/08/23      NOV:02/13/24    Medication:   Medication Quantity Refills Start End   Semaglutide (RYBELSUS) 3 MG Oral Tab  30 tablet 0 10/30/2023 11/29/2023   Sig:   Take 3 mg by mouth daily.            Rx given on 10/30/2023 per Maudry Halsted, NP

## 2024-01-17 ENCOUNTER — MED REC SCAN ONLY (OUTPATIENT)
Dept: FAMILY MEDICINE CLINIC | Facility: CLINIC | Age: 74
End: 2024-01-17

## 2024-01-17 DIAGNOSIS — E11.65 UNCONTROLLED TYPE 2 DIABETES MELLITUS WITH HYPERGLYCEMIA, WITHOUT LONG-TERM CURRENT USE OF INSULIN (HCC): ICD-10-CM

## 2024-01-17 RX ORDER — ORAL SEMAGLUTIDE 3 MG/1
3 TABLET ORAL DAILY
Qty: 30 TABLET | Refills: 0 | Status: SHIPPED | OUTPATIENT
Start: 2024-01-17 | End: 2024-02-16

## 2024-01-17 NOTE — TELEPHONE ENCOUNTER
LOV:   for:Hospitalization follow-up   Patient advised to RTC on:   February Nov:02/13/2024    Medication Quantity Refills Start End   Semaglutide (RYBELSUS) 3 MG Oral Tab 30 tablet 0 12/18/2023 1/17/2024   Sig:   Take 3 mg by mouth daily.

## 2024-01-18 NOTE — TELEPHONE ENCOUNTER
LOV:12-8-23    Last Refill:  Medication Quantity Refills Start End   pioglitazone 30 MG Oral Tab 90 tablet 0 10/30/2023          Protocol   Diabetic Medication Protocol [id number]/17/2024 11:01 AM   Protocol Details Last HgBA1C < 7.5    HgBA1C procedure resulted in past 6 months    Microalbumin procedure in past 12 months or taking ACE/ARB    Appointment in past 6 or next 3 months           Last Refill:  Medication Quantity Refills Start End   SITagliptin Phosphate 100 MG Oral Tab 90 tablet 0 10/30/2023 --       Protocol     Diabetic Medication Protocol Riwozg7401/17/2024 11:01 AM   Protocol Details Last HgBA1C < 7.5    HgBA1C procedure resulted in past 6 months    Microalbumin procedure in past 12 months or taking ACE/ARB    Appointment in past 6 or next 3 months            Please sign if appropriate

## 2024-01-19 RX ORDER — PIOGLITAZONEHYDROCHLORIDE 30 MG/1
30 TABLET ORAL DAILY
Qty: 90 TABLET | Refills: 0 | Status: SHIPPED | OUTPATIENT
Start: 2024-01-19

## 2024-02-12 ENCOUNTER — LAB ENCOUNTER (OUTPATIENT)
Dept: LAB | Age: 74
End: 2024-02-12
Attending: FAMILY MEDICINE
Payer: MEDICARE

## 2024-02-12 DIAGNOSIS — E11.65 UNCONTROLLED TYPE 2 DIABETES MELLITUS WITH HYPERGLYCEMIA, WITHOUT LONG-TERM CURRENT USE OF INSULIN (HCC): ICD-10-CM

## 2024-02-12 DIAGNOSIS — Z86.39 HISTORY OF DIABETIC KETOACIDOSIS: ICD-10-CM

## 2024-02-12 DIAGNOSIS — E55.9 VITAMIN D DEFICIENCY: ICD-10-CM

## 2024-02-12 DIAGNOSIS — E78.2 HYPERLIPIDEMIA, MIXED: ICD-10-CM

## 2024-02-12 DIAGNOSIS — I65.22 LEFT CAROTID STENOSIS: ICD-10-CM

## 2024-02-12 LAB
ALBUMIN SERPL-MCNC: 3.5 G/DL (ref 3.4–5)
ALBUMIN/GLOB SERPL: 0.9 {RATIO} (ref 1–2)
ALP LIVER SERPL-CCNC: 97 U/L
ALT SERPL-CCNC: 29 U/L
ANION GAP SERPL CALC-SCNC: 2 MMOL/L (ref 0–18)
AST SERPL-CCNC: 16 U/L (ref 15–37)
BASOPHILS # BLD AUTO: 0.08 X10(3) UL (ref 0–0.2)
BASOPHILS NFR BLD AUTO: 1.2 %
BILIRUB SERPL-MCNC: 0.3 MG/DL (ref 0.1–2)
BUN BLD-MCNC: 14 MG/DL (ref 9–23)
CALCIUM BLD-MCNC: 9.8 MG/DL (ref 8.5–10.1)
CHLORIDE SERPL-SCNC: 110 MMOL/L (ref 98–112)
CHOLEST SERPL-MCNC: 186 MG/DL (ref ?–200)
CO2 SERPL-SCNC: 28 MMOL/L (ref 21–32)
CREAT BLD-MCNC: 0.82 MG/DL
CREAT UR-SCNC: 142 MG/DL
EGFRCR SERPLBLD CKD-EPI 2021: 75 ML/MIN/1.73M2 (ref 60–?)
EOSINOPHIL # BLD AUTO: 0.23 X10(3) UL (ref 0–0.7)
EOSINOPHIL NFR BLD AUTO: 3.3 %
ERYTHROCYTE [DISTWIDTH] IN BLOOD BY AUTOMATED COUNT: 14.9 %
EST. AVERAGE GLUCOSE BLD GHB EST-MCNC: 163 MG/DL (ref 68–126)
FASTING PATIENT LIPID ANSWER: YES
FASTING STATUS PATIENT QL REPORTED: YES
GLOBULIN PLAS-MCNC: 3.9 G/DL (ref 2.8–4.4)
GLUCOSE BLD-MCNC: 161 MG/DL (ref 70–99)
HBA1C MFR BLD: 7.3 % (ref ?–5.7)
HCT VFR BLD AUTO: 43.5 %
HDLC SERPL-MCNC: 65 MG/DL (ref 40–59)
HGB BLD-MCNC: 13.8 G/DL
IMM GRANULOCYTES # BLD AUTO: 0.03 X10(3) UL (ref 0–1)
IMM GRANULOCYTES NFR BLD: 0.4 %
LDLC SERPL CALC-MCNC: 110 MG/DL (ref ?–100)
LYMPHOCYTES # BLD AUTO: 1.62 X10(3) UL (ref 1–4)
LYMPHOCYTES NFR BLD AUTO: 23.4 %
MCH RBC QN AUTO: 29.3 PG (ref 26–34)
MCHC RBC AUTO-ENTMCNC: 31.7 G/DL (ref 31–37)
MCV RBC AUTO: 92.4 FL
MICROALBUMIN UR-MCNC: 1.03 MG/DL
MICROALBUMIN/CREAT 24H UR-RTO: 7.3 UG/MG (ref ?–30)
MONOCYTES # BLD AUTO: 0.59 X10(3) UL (ref 0.1–1)
MONOCYTES NFR BLD AUTO: 8.5 %
NEUTROPHILS # BLD AUTO: 4.38 X10 (3) UL (ref 1.5–7.7)
NEUTROPHILS # BLD AUTO: 4.38 X10(3) UL (ref 1.5–7.7)
NEUTROPHILS NFR BLD AUTO: 63.2 %
NONHDLC SERPL-MCNC: 121 MG/DL (ref ?–130)
OSMOLALITY SERPL CALC.SUM OF ELEC: 294 MOSM/KG (ref 275–295)
PLATELET # BLD AUTO: 381 10(3)UL (ref 150–450)
POTASSIUM SERPL-SCNC: 4.3 MMOL/L (ref 3.5–5.1)
PROT SERPL-MCNC: 7.4 G/DL (ref 6.4–8.2)
RBC # BLD AUTO: 4.71 X10(6)UL
SODIUM SERPL-SCNC: 140 MMOL/L (ref 136–145)
TRIGL SERPL-MCNC: 60 MG/DL (ref 30–149)
VIT D+METAB SERPL-MCNC: 48.6 NG/ML (ref 30–100)
VLDLC SERPL CALC-MCNC: 10 MG/DL (ref 0–30)
WBC # BLD AUTO: 6.9 X10(3) UL (ref 4–11)

## 2024-02-12 PROCEDURE — 36415 COLL VENOUS BLD VENIPUNCTURE: CPT

## 2024-02-12 PROCEDURE — 82570 ASSAY OF URINE CREATININE: CPT

## 2024-02-12 PROCEDURE — 85025 COMPLETE CBC W/AUTO DIFF WBC: CPT

## 2024-02-12 PROCEDURE — 82306 VITAMIN D 25 HYDROXY: CPT

## 2024-02-12 PROCEDURE — 82043 UR ALBUMIN QUANTITATIVE: CPT

## 2024-02-12 PROCEDURE — 80053 COMPREHEN METABOLIC PANEL: CPT

## 2024-02-12 PROCEDURE — 83036 HEMOGLOBIN GLYCOSYLATED A1C: CPT

## 2024-02-12 PROCEDURE — 80061 LIPID PANEL: CPT

## 2024-02-13 ENCOUNTER — OFFICE VISIT (OUTPATIENT)
Dept: FAMILY MEDICINE CLINIC | Facility: CLINIC | Age: 74
End: 2024-02-13
Payer: MEDICARE

## 2024-02-13 VITALS
HEART RATE: 76 BPM | BODY MASS INDEX: 27.29 KG/M2 | WEIGHT: 139 LBS | SYSTOLIC BLOOD PRESSURE: 108 MMHG | DIASTOLIC BLOOD PRESSURE: 64 MMHG | TEMPERATURE: 98 F | RESPIRATION RATE: 12 BRPM | HEIGHT: 60 IN

## 2024-02-13 DIAGNOSIS — Z78.0 POSTMENOPAUSAL: ICD-10-CM

## 2024-02-13 DIAGNOSIS — E78.2 HYPERLIPIDEMIA, MIXED: ICD-10-CM

## 2024-02-13 DIAGNOSIS — E55.9 VITAMIN D DEFICIENCY: ICD-10-CM

## 2024-02-13 DIAGNOSIS — Z00.00 ENCOUNTER FOR ANNUAL HEALTH EXAMINATION: Primary | ICD-10-CM

## 2024-02-13 DIAGNOSIS — E11.65 UNCONTROLLED TYPE 2 DIABETES MELLITUS WITH HYPERGLYCEMIA, WITHOUT LONG-TERM CURRENT USE OF INSULIN (HCC): ICD-10-CM

## 2024-02-13 DIAGNOSIS — I65.22 ASYMPTOMATIC CAROTID ARTERY NARROWING WITHOUT INFARCTION, LEFT: ICD-10-CM

## 2024-02-13 DIAGNOSIS — E78.00 TYPE 2 DIABETES MELLITUS WITH HYPERCHOLESTEROLEMIA  (HCC): ICD-10-CM

## 2024-02-13 DIAGNOSIS — R12 HEARTBURN: ICD-10-CM

## 2024-02-13 DIAGNOSIS — Z12.31 SCREENING MAMMOGRAM FOR BREAST CANCER: ICD-10-CM

## 2024-02-13 DIAGNOSIS — E11.69 TYPE 2 DIABETES MELLITUS WITH HYPERCHOLESTEROLEMIA  (HCC): ICD-10-CM

## 2024-02-13 PROCEDURE — 99214 OFFICE O/P EST MOD 30 MIN: CPT | Performed by: FAMILY MEDICINE

## 2024-02-13 PROCEDURE — G0439 PPPS, SUBSEQ VISIT: HCPCS | Performed by: FAMILY MEDICINE

## 2024-02-13 RX ORDER — ORAL SEMAGLUTIDE 7 MG/1
1 TABLET ORAL DAILY
Qty: 30 TABLET | Refills: 1 | Status: SHIPPED | OUTPATIENT
Start: 2024-02-13 | End: 2024-03-14

## 2024-02-13 RX ORDER — ORAL SEMAGLUTIDE 3 MG/1
3 TABLET ORAL DAILY
Qty: 90 TABLET | Refills: 1 | Status: CANCELLED | OUTPATIENT
Start: 2024-02-13 | End: 2024-03-14

## 2024-02-13 RX ORDER — FAMOTIDINE 40 MG/1
40 TABLET, FILM COATED ORAL 2 TIMES DAILY
Qty: 180 TABLET | Refills: 1 | Status: SHIPPED | OUTPATIENT
Start: 2024-02-13

## 2024-02-13 RX ORDER — ROSUVASTATIN CALCIUM 10 MG/1
10 TABLET, COATED ORAL DAILY
Qty: 90 TABLET | Refills: 1 | Status: SHIPPED | OUTPATIENT
Start: 2024-02-13

## 2024-02-13 NOTE — PATIENT INSTRUCTIONS
Shingles vaccination.  RSV shot.  Increase Rybelsus to 7 mg 1 tablet daily.  If you notice that your a.m. sugars start running above 130 call office we can increase the medication after using  Rybelsus for 1 month.  Watch diet for carbs have less bread, less pasta, less rice less potatoes and sweets.  Low-fat diet.  Call [phone] to schedule Mammogram and U carotids.   Call [phone] to schedule DEXA scan after May 18, 2024.  Continue other medications.  Do fasting blood work after May 12, 2024 prior next visit.          Massiel Anders's SCREENING SCHEDULE   Tests on this list are recommended by your physician but may not be covered, or covered at this frequency, by your insurer.   Please check with your insurance carrier before scheduling to verify coverage.   PREVENTATIVE SERVICES FREQUENCY &  COVERAGE DETAILS LAST COMPLETION DATE   Diabetes Screening    Fasting Blood Sugar /  Glucose    One screening every 12 months if never tested or if previously tested but not diagnosed with pre-diabetes   One screening every 6 months if diagnosed with pre-diabetes Lab Results   Component Value Date     (H) 02/12/2024        Cardiovascular Disease Screening    Lipid Panel  Cholesterol  Lipoprotein (HDL)  Triglycerides Covered every 5 years for all Medicare beneficiaries without apparent signs or symptoms of cardiovascular disease Lab Results   Component Value Date    CHOLEST 186 02/12/2024    HDL 65 (H) 02/12/2024     (H) 02/12/2024    TRIG 60 02/12/2024         Electrocardiogram (EKG)   Covered if needed at Welcome to Medicare, and non-screening if indicated for medical reasons 01/20/2016      Ultrasound Screening for Abdominal Aortic Aneurysm (AAA) Covered once in a lifetime for one of the following risk factors    Men who are 65-75 years old and have ever smoked    Anyone with a family history -     Colorectal Cancer Screening  Covered for ages 50-85; only need ONE of the following:     Colonoscopy   Covered every 10 years    Covered every 2 years if patient is at high risk or previous colonoscopy was abnormal 07/22/2022    Health Maintenance   Topic Date Due    Colorectal Cancer Screening  07/22/2029       Flexible Sigmoidoscopy   Covered every 4 years -    Fecal Occult Blood Test Covered annually -   Bone Density Screening    Bone density screening    Covered every 2 years after age 65 if diagnosed with risk of osteoporosis or estrogen deficiency.    Covered yearly for long-term glucocorticoid medication use (Steroids) Last Dexa Scan:    XR DEXA BONE DENSITOMETRY (CPT=77080) 05/18/2022      No recommendations at this time   Pap and Pelvic    Pap   Covered every 2 years for women at normal risk; Annually if at high risk -  No recommendations at this time    Chlamydia Annually if high risk -  No recommendations at this time   Screening Mammogram    Mammogram     Recommend annually for all female patients aged 40 and older    One baseline mammogram covered for patients aged 35-39 05/17/2022    Health Maintenance   Topic Date Due    Mammogram  05/17/2023       Immunizations    Influenza Covered once per flu season  Please get every year 11/20/2023  No recommendations at this time    Pneumococcal Each vaccine (Zeezoer95 & [id number]) covered once after 65 Prevnar 13: 01/18/2016    Occrmbdxk24: 02/13/2017     No recommendations at this time    Hepatitis B One screening covered for patients with certain risk factors   07/25/2022  No recommendations at this time    Tetanus Toxoid Not covered by Medicare Part B unless medically necessary (cut with metal); may be covered with your pharmacy prescription benefits -    Tetanus, Diptheria and Pertusis TD and TDaP Not covered by Medicare Part B -  No recommendations at this time    Zoster Not covered by Medicare Part B; may be covered with your pharmacy  prescription benefits 01/05/2023  Zoster Vaccines(2 of 3) due on 03/02/2023     Diabetes      Hemoglobin  A1C Annually; if last result is elevated, may be asked to retest more frequently.    Medicare covers every 3 months Lab Results   Component Value Date     (H) 02/12/2024    A1C 7.3 (H) 02/12/2024       No recommendations at this time    Creat/alb ratio Annually Lab Results   Component Value Date    MICROALBCREA 7.3 02/12/2024       LDL Annually Lab Results   Component Value Date     (H) 02/12/2024       Dilated Eye Exam Annually [unfilled]

## 2024-02-13 NOTE — PROGRESS NOTES
Subjective:   Massiel Anders is a 73 year old female who presents for a Medicare Subsequent Annual Wellness visit (Pt already had Initial Annual Wellness).  Patient is also coming here for follow-up on her diabetes, hypertension, hyperlipidemia , vitamin D deficiency, osteopenia,left carotid atherosclerosis, postmenopausal, colon cancer screening.    Diabetes patient is taking her medications regularly.  She is only taking Rybelsus right now.  She tolerates medication well.  She stopped other meds.  Will increase the dose of Rybelsus.  Blood sugar in the morning stays below 130.  She is trying to get lower sugars after eating.  She thinks but there were 90s 2 hours after she was eating before.  She has eye examination scheduled for today.  Denies any numbness or tingling of the feet.      Heartburn using famotidine stable continue present management.    Hypertension blood pressure stable,  continue current medications monitor    Hyperlipidemia patient is taking medication we will check the blood work further recommendation pending test results,  she needs refill of the medication    Vitamin D deficiency continue supplementation monitor blood work    Postmenopausal/osteopenia check bone density scan further recommendation pending results    Left carotid atherosclerosis we will have patient to check ultrasound of the carotids she will forward however testing from the physician she had in [place]    Colon cancer screening  colonoscopy is up-to-date.          History/Other:   Fall Risk Assessment:   She has been screened for Falls and is low risk.      Cognitive Assessment:   She had a completely normal cognitive assessment - see flowsheet entries     Functional Ability/Status:   Massiel Anders has a completely normal functional assessment. See flowsheet for details.          Depression Screening (PHQ-2/PHQ-9): PHQ-2 SCORE: 0  , done 2/13/2024        Advanced Directives:   She does NOT have a Living Will.  [Do you have a living will?: No]  She does NOT have a Power of  for Health Care. [Do you have a healthcare power of ?: No]  Discussed Advance Care Planning with patient (and family/surrogate if present). Standard forms made available to patient in After Visit Summary.      Patient Active Problem List   Diagnosis   • New onset type 2 diabetes mellitus (HCC)   • Elevated liver function tests   • Snoring   • Type 2 diabetes mellitus without complication, without long-term current use of insulin (HCC)   • Osteopenia   • Asymptomatic carotid artery narrowing without infarction, left   • Hyperlipidemia, mixed   • Vitamin D deficiency   • Nasal congestion   • Type 2 diabetes mellitus with other circulatory complications (HCC)   • Colon cancer screening   • Gastroesophageal reflux disease   • GERD (gastroesophageal reflux disease)   • Heart burn   • Personal history of colonic polyps   • Abnormal CT of the chest     Allergies:  She is allergic to metformin.    Current Medications:  Outpatient Medications Marked as Taking for the 2/13/24 encounter (Office Visit) with Salima Vega MD   Medication Sig   • rosuvastatin 10 MG Oral Tab Take 1 tablet (10 mg total) by mouth daily.   • famotidine 40 MG Oral Tab Take 1 tablet (40 mg total) by mouth 2 (two) times daily.   • Semaglutide (RYBELSUS) 7 MG Oral Tab Take 1 tablet by mouth daily.   • pioglitazone 30 MG Oral Tab Take 1 tablet (30 mg total) by mouth daily.   • SITagliptin Phosphate 100 MG Oral Tab Take 1 tablet (100 mg total) by mouth daily.   • Semaglutide (RYBELSUS) 3 MG Oral Tab Take 3 mg by mouth daily.   • FreeStyle Lancets Does not apply Misc Test  3times daily   • Glucose Blood In Vitro Strip Test 3 times daily       Medical History:  She  has a past medical history of Allergic rhinitis (unknown), Arthritis (6 months), Chronic cough (since child (6yr old)), Diabetes (HCC), Esophageal reflux (6 months), Heartburn, History of cardiac murmur, Leaking  of urine, Obesity, Sleep apnea, and Sleep disturbance.  Surgical History:  She  has a past surgical history that includes tonsillectomy; other surgical history; hysterectomy (); colonoscopy (11years ago); total abdom hysterectomy (35 years ago); and  (, ).   Family History:  Her family history includes Diabetes in her daughter, father, mother, and sister; Hypertension in her mother; Other in her father; Prostate Cancer in her father; Stroke in her mother.  Social History:  She  reports that she has never smoked. She has never used smokeless tobacco. She reports current alcohol use of about 2.0 standard drinks of alcohol per week. She reports that she does not use drugs.    Tobacco:  She has never smoked tobacco.    CAGE Alcohol Screen:   CAGE screening score of 0 on 2024, showing low risk of alcohol abuse.      Patient Care Team:  Salima Vega MD as PCP - General (Family Medicine)  Raimundo Rodriguez MD (GASTROENTEROLOGY)    Review of Systems  GENERAL: feels well otherwise  SKIN: denies any unusual skin lesions  EYES: denies blurred vision or double vision  HEENT: denies nasal congestion, sinus pain or ST  LUNGS: denies shortness of breath with exertion  CARDIOVASCULAR: denies chest pain on exertion  GI: denies abdominal pain, denies heartburn  : denies dysuria, vaginal discharge or itching, no complaint of urinary incontinence   MUSCULOSKELETAL: denies back pain  NEURO: denies headaches  PSYCHE: denies depression or anxiety  HEMATOLOGIC: denies hx of anemia  ENDOCRINE: denies thyroid history  ALL/ASTHMA: denies hx of allergy or asthma    Objective:   Physical Exam  General Appearance:  Alert, cooperative, no distress, appears stated age   Head:  Normocephalic, without obvious abnormality, atraumatic   Eyes:  PERRL, conjunctiva/corneas clear, EOM's intact both eyes   Ears:  Normal TM's and external ear canals, both ears   Nose: Nares normal, septum midline,mucosa normal, no drainage or  sinus tenderness   Throat: Lips, mucosa, and tongue normal; teeth and gums normal   Neck: Supple, symmetrical, trachea midline, no adenopathy;  thyroid: not enlarged, symmetric, no tenderness/mass/nodules; no carotid bruit   Back:   Symmetric, no curvature, ROM normal, no CVA tenderness   Lungs:   Clear to auscultation bilaterally, respirations unlabored   Heart:  Regular rate and rhythm, S1 and S2 normal, no murmur,    Abdomen:   Soft, non-tender, bowel sounds active all four quadrants,  no masses, no organomegaly   Pelvic: Deferred  Breast exam -  no palpable masses no supraclavicular no axilla lymphadenopathy   Extremities: Extremities normal, atraumatic, no cyanosis or edema   Pulses: 2+ and symmetric   Skin: Skin color, texture, turgor normal, no rashes or lesions   Lymph nodes: Cervical, supraclavicular, and axillary nodes normal   Neurologic: Normal   Bilateral barefoot skin diabetic exam is normal, except some dryness of the feet also there is some osteoarthritis changes of the toes.    Bilateral monofilament/sensation of both feet is normal.  Pulsation pedal pulse exam of both lower legs/feet is normal as well.    /64 (BP Location: Left arm, Patient Position: Sitting, Cuff Size: adult)   Pulse 76   Temp 98 °F (36.7 °C) (Temporal)   Resp 12   Ht 5' (1.524 m)   Wt 139 lb (63 kg)   BMI 27.15 kg/m²  Estimated body mass index is 27.15 kg/m² as calculated from the following:    Height as of this encounter: 5' (1.524 m).    Weight as of this encounter: 139 lb (63 kg).    Medicare Hearing Assessment:   Hearing Screening    Time taken: 2/13/2024  9:28 AM  Screening Method: Finger Rub           Results for orders placed or performed in visit on 02/12/24   Hemoglobin A1C [E]   Result Value Ref Range    HgbA1C 7.3 (H) <5.7 %    Estimated Average Glucose 163 (H) 68 - 126 mg/dL   Microalb/Creat Ratio, Random Urine [E]   Result Value Ref Range    Microalbumin, Urine 1.03 mg/dL    Creatinine Ur Random 142.00  mg/dL    Malb/Cre Calc 7.3 <=30.0 ug/mg   Comp Metabolic Panel (14) [E]   Result Value Ref Range    Glucose 161 (H) 70 - 99 mg/dL    Sodium 140 136 - 145 mmol/L    Potassium 4.3 3.5 - 5.1 mmol/L    Chloride 110 98 - 112 mmol/L    CO2 28.0 21.0 - 32.0 mmol/L    Anion Gap 2 0 - 18 mmol/L    BUN 14 9 - 23 mg/dL    Creatinine 0.82 0.55 - 1.02 mg/dL    Calcium, Total 9.8 8.5 - 10.1 mg/dL    Calculated Osmolality 294 275 - 295 mOsm/kg    eGFR-Cr 75 >=60 mL/min/1.73m2    AST 16 15 - 37 U/L    ALT 29 13 - 56 U/L    Alkaline Phosphatase 97 55 - 142 U/L    Bilirubin, Total 0.3 0.1 - 2.0 mg/dL    Total Protein 7.4 6.4 - 8.2 g/dL    Albumin 3.5 3.4 - 5.0 g/dL    Globulin  3.9 2.8 - 4.4 g/dL    A/G Ratio 0.9 (L) 1.0 - 2.0    Patient Fasting for CMP? Yes    Lipid Panel [E]   Result Value Ref Range    Cholesterol, Total 186 <200 mg/dL    HDL Cholesterol 65 (H) 40 - 59 mg/dL    Triglycerides 60 30 - 149 mg/dL    LDL Cholesterol 110 (H) <100 mg/dL    VLDL 10 0 - 30 mg/dL    Non HDL Chol 121 <130 mg/dL    Patient Fasting for Lipid? Yes    Vitamin D, 25-Hydroxy   Result Value Ref Range    Vitamin D, 25OH, Total 48.6 30.0 - 100.0 ng/mL   CBC W/ DIFFERENTIAL   Result Value Ref Range    WBC 6.9 4.0 - 11.0 x10(3) uL    RBC 4.71 3.80 - 5.30 x10(6)uL    HGB 13.8 12.0 - 16.0 g/dL    HCT 43.5 35.0 - 48.0 %    .0 150.0 - 450.0 10(3)uL    MCV 92.4 80.0 - 100.0 fL    MCH 29.3 26.0 - 34.0 pg    MCHC 31.7 31.0 - 37.0 g/dL    RDW 14.9 %    Neutrophil Absolute Prelim 4.38 1.50 - 7.70 x10 (3) uL    Neutrophil Absolute 4.38 1.50 - 7.70 x10(3) uL    Lymphocyte Absolute 1.62 1.00 - 4.00 x10(3) uL    Monocyte Absolute 0.59 0.10 - 1.00 x10(3) uL    Eosinophil Absolute 0.23 0.00 - 0.70 x10(3) uL    Basophil Absolute 0.08 0.00 - 0.20 x10(3) uL    Immature Granulocyte Absolute 0.03 0.00 - 1.00 x10(3) uL    Neutrophil % 63.2 %    Lymphocyte % 23.4 %    Monocyte % 8.5 %    Eosinophil % 3.3 %    Basophil % 1.2 %    Immature Granulocyte % 0.4 %        Assessment & Plan:   Massiel Anders is a 73 year old female who presents for a Medicare Assessment.     1. Encounter for annual health examination (Primary)  2. Uncontrolled type 2 diabetes mellitus with hyperglycemia, without long-term current use of insulin (HCC)  -     Hemoglobin A1C; Future; Expected date: 02/13/2024  -     Microalb/Creat Ratio, Random Urine; Future; Expected date: 02/13/2024  -     OPHTHALMOLOGY - INTERNAL  -     Rybelsus; Take 1 tablet by mouth daily.  Dispense: 30 tablet; Refill: 1  3. Hyperlipidemia, mixed  -     Comp Metabolic Panel (14); Future; Expected date: 02/13/2024  -     Lipid Panel; Future; Expected date: 02/13/2024  -     Rosuvastatin Calcium; Take 1 tablet (10 mg total) by mouth daily.  Dispense: 90 tablet; Refill: 1  4. Screening mammogram for breast cancer  -     [facility] SRIDHAR 2D+3D SCREENING BILAT (CPT=77067/73745); Future; Expected date: 02/13/2024  5. Heartburn  -     Famotidine; Take 1 tablet (40 mg total) by mouth 2 (two) times daily.  Dispense: 180 tablet; Refill: 1  6. Asymptomatic carotid artery narrowing without infarction, left  -     US CAROTID DOPPLER BILAT - DIAG IMG (CPT=93880); Future; Expected date: 02/13/2024  7. Postmenopausal  -     XR DEXA BONE DENSITOMETRY (CPT=77080); Future; Expected date: 02/13/2024  8. Vitamin D deficiency  9. Type 2 diabetes mellitus with hypercholesterolemia  (HCC)     Diabetes type 2 continue monitoring blood work patient will return for testing continue current medications refills were given today.  Patient is due for eye examination denies any numbness or tingling of the feet. Further recommendation pending test results.    Hyperlipidemia on medication continue medication monitor blood work.    Heartburn on famotidine and continue present management.    Breast cancer screening mammogram ordered breast examination normal today.    Osteopenia check bone density scan monitor.    Left carotid stenosis check ultrasound  monitor.    Vitamin D deficiency continue supplementation monitor blood work.    Screening for colon cancer colonoscopy is up-to-date.    Postmenopausal check DEXA scan.      Shingles vaccination.  RSV shot.  Increase Rybelsus to 7 mg 1 tablet daily.  If you notice that your a.m. sugars start running above 130 call office we can increase the medication after using  Rybelsus for 1 month.  Watch diet for carbs have less bread, less pasta, less rice less potatoes and sweets.  Low-fat diet.  Call [phone] to schedule Mammogram and U carotids.   Call [phone] to schedule DEXA scan after May 18, 2024.  Continue other medications.  Do fasting blood work after May 12, 2024 prior next visit.    The patient indicates understanding of these issues and agrees to the plan.  Further testing ordered.  Imaging studies ordered.  Lab work ordered.  Reinforced healthy diet, lifestyle, and exercise.      No follow-ups on file.     Salima Vega MD, 3/9/2022     Supplementary Documentation:   General Health:  In the past six months, have you lost more than 10 pounds without trying?: 1 - Yes  Has your appetite been poor?: No  Type of Diet: Diabetic  How does the patient maintain a good energy level?: Daily Walks  How would you describe your daily physical activity?: Moderate  How would you describe your current health state?: Good  How do you maintain positive mental well-being?: Puzzles;Games;Visiting Family  On a scale of 0 to 10, with 0 being no pain and 10 being severe pain, what is your pain level?: 0 - (None)  In the past six months, have you experienced urine leakage?: 0-No  At any time do you feel concerned for the safety/well-being of yourself and/or your children, in your home or elsewhere?: No  Have you had any immunizations at another office such as Influenza, Hepatitis B, Tetanus, or Pneumococcal?: Yes       Massiel Anders's SCREENING SCHEDULE   Tests on this list are recommended by your physician but may  not be covered, or covered at this frequency, by your insurer.   Please check with your insurance carrier before scheduling to verify coverage.   PREVENTATIVE SERVICES FREQUENCY &  COVERAGE DETAILS LAST COMPLETION DATE   Diabetes Screening    Fasting Blood Sugar /  Glucose    One screening every 12 months if never tested or if previously tested but not diagnosed with pre-diabetes   One screening every 6 months if diagnosed with pre-diabetes Lab Results   Component Value Date     (H) 02/12/2024        Cardiovascular Disease Screening    Lipid Panel  Cholesterol  Lipoprotein (HDL)  Triglycerides Covered every 5 years for all Medicare beneficiaries without apparent signs or symptoms of cardiovascular disease Lab Results   Component Value Date    CHOLEST 186 02/12/2024    HDL 65 (H) 02/12/2024     (H) 02/12/2024    TRIG 60 02/12/2024         Electrocardiogram (EKG)   Covered if needed at Welcome to Medicare, and non-screening if indicated for medical reasons 01/20/2016      Ultrasound Screening for Abdominal Aortic Aneurysm (AAA) Covered once in a lifetime for one of the following risk factors   • Men who are 65-75 years old and have ever smoked   • Anyone with a family history -     Colorectal Cancer Screening  Covered for ages 50-85; only need ONE of the following:    Colonoscopy   Covered every 10 years    Covered every 2 years if patient is at high risk or previous colonoscopy was abnormal 07/22/2022    Health Maintenance   Topic Date Due   • Colorectal Cancer Screening  07/22/2029       Flexible Sigmoidoscopy   Covered every 4 years -    Fecal Occult Blood Test Covered annually -   Bone Density Screening    Bone density screening    Covered every 2 years after age 65 if diagnosed with risk of osteoporosis or estrogen deficiency.    Covered yearly for long-term glucocorticoid medication use (Steroids) Last Dexa Scan:    XR DEXA BONE DENSITOMETRY (CPT=77080) 05/18/2022      No recommendations at this  time   Pap and Pelvic    Pap   Covered every 2 years for women at normal risk; Annually if at high risk -  No recommendations at this time    Chlamydia Annually if high risk -  No recommendations at this time   Screening Mammogram    Mammogram     Recommend annually for all female patients aged 40 and older    One baseline mammogram covered for patients aged 35-39 05/17/2022    Health Maintenance   Topic Date Due   • Mammogram  05/17/2023       Immunizations    Influenza Covered once per flu season  Please get every year 11/20/2023  No recommendations at this time    Pneumococcal Each vaccine ([id number] & [id number]) covered once after 65 Prevnar 13: 01/18/2016    [id number]: 02/13/2017     No recommendations at this time    Hepatitis B One screening covered for patients with certain risk factors   07/25/2022  No recommendations at this time    Tetanus Toxoid Not covered by Medicare Part B unless medically necessary (cut with metal); may be covered with your pharmacy prescription benefits -    Tetanus, Diptheria and Pertusis TD and TDaP Not covered by Medicare Part B -  No recommendations at this time    Zoster Not covered by Medicare Part B; may be covered with your pharmacy  prescription benefits 01/05/2023  Zoster Vaccines(2 of 3) due on 03/02/2023       Diabetes      Hemoglobin A1C Annually; if last result is elevated, may be asked to retest more frequently.    Medicare covers every 3 months Lab Results   Component Value Date     (H) 02/12/2024    A1C 7.3 (H) 02/12/2024       No recommendations at this time    Creat/alb ratio Annually Lab Results   Component Value Date    MICROALBCREA 7.3 02/12/2024       LDL Annually Lab Results   Component Value Date     (H) 02/12/2024       Dilated Eye Exam Annually Last Diabetic Eye Exam:  Last Dilated Eye Exam: 02/13/24  Eye Exam shows Diabetic Retinopathy?: No

## 2024-02-13 NOTE — PROGRESS NOTES
Subjective:   Massiel Anders is a 73 year old female who presents for a Medicare Subsequent Annual Wellness visit (Pt already had Initial Annual Wellness).  Patient is also coming here for follow-up on her diabetes, hypertension, hyperlipidemia , vitamin D deficiency, osteopenia,left carotid atherosclerosis, postmenopausal, colon cancer screening.    Diabetes patient is taking her medications regularly.  She is only taking Rybelsus right now.  She tolerates medication well.  She stopped other meds.  Will increase the dose of Rybelsus.  Blood sugar in the morning stays below 130.  She is trying to get lower sugars after eating.  She thinks but there were 90s 2 hours after she was eating before.  She has eye examination scheduled for today.  Denies any numbness or tingling of the feet.      Heartburn using famotidine stable continue present management.    Hypertension blood pressure stable,  continue current medications monitor    Hyperlipidemia patient is taking medication we will check the blood work further recommendation pending test results,  she needs refill of the medication    Vitamin D deficiency continue supplementation monitor blood work    Postmenopausal/osteopenia check bone density scan further recommendation pending results    Left carotid atherosclerosis we will have patient to check ultrasound of the carotids she will forward however testing from the physician she had in [place]    Colon cancer screening  colonoscopy is up-to-date.          History/Other:   Fall Risk Assessment:   She has been screened for Falls and is low risk.      Cognitive Assessment:   She had a completely normal cognitive assessment - see flowsheet entries     Functional Ability/Status:   Massiel Anders has a completely normal functional assessment. See flowsheet for details.          Depression Screening (PHQ-2/PHQ-9): PHQ-2 SCORE: 0  , done 2/13/2024        Advanced Directives:   She does NOT have a Living Will.  [Do you have a living will?: No]  She does NOT have a Power of  for Health Care. [Do you have a healthcare power of ?: No]  Discussed Advance Care Planning with patient (and family/surrogate if present). Standard forms made available to patient in After Visit Summary.      Patient Active Problem List   Diagnosis   • New onset type 2 diabetes mellitus (HCC)   • Elevated liver function tests   • Snoring   • Type 2 diabetes mellitus without complication, without long-term current use of insulin (HCC)   • Osteopenia   • Asymptomatic carotid artery narrowing without infarction, left   • Hyperlipidemia, mixed   • Vitamin D deficiency   • Nasal congestion   • Type 2 diabetes mellitus with other circulatory complications (HCC)   • Colon cancer screening   • Gastroesophageal reflux disease   • GERD (gastroesophageal reflux disease)   • Heart burn   • Personal history of colonic polyps   • Abnormal CT of the chest     Allergies:  She is allergic to metformin.    Current Medications:  Outpatient Medications Marked as Taking for the 2/13/24 encounter (Office Visit) with Salima Vega MD   Medication Sig   • rosuvastatin 10 MG Oral Tab Take 1 tablet (10 mg total) by mouth daily.   • famotidine 40 MG Oral Tab Take 1 tablet (40 mg total) by mouth 2 (two) times daily.   • Semaglutide (RYBELSUS) 7 MG Oral Tab Take 1 tablet by mouth daily.   • pioglitazone 30 MG Oral Tab Take 1 tablet (30 mg total) by mouth daily.   • SITagliptin Phosphate 100 MG Oral Tab Take 1 tablet (100 mg total) by mouth daily.   • Semaglutide (RYBELSUS) 3 MG Oral Tab Take 3 mg by mouth daily.   • FreeStyle Lancets Does not apply Misc Test  3times daily   • Glucose Blood In Vitro Strip Test 3 times daily       Medical History:  She  has a past medical history of Allergic rhinitis (unknown), Arthritis (6 months), Chronic cough (since child (6yr old)), Diabetes (HCC), Esophageal reflux (6 months), Heartburn, History of cardiac murmur, Leaking  of urine, Obesity, Sleep apnea, and Sleep disturbance.  Surgical History:  She  has a past surgical history that includes tonsillectomy; other surgical history; hysterectomy (); colonoscopy (11years ago); total abdom hysterectomy (35 years ago); and  (, ).   Family History:  Her family history includes Diabetes in her daughter, father, mother, and sister; Hypertension in her mother; Other in her father; Prostate Cancer in her father; Stroke in her mother.  Social History:  She  reports that she has never smoked. She has never used smokeless tobacco. She reports current alcohol use of about 2.0 standard drinks of alcohol per week. She reports that she does not use drugs.    Tobacco:  She has never smoked tobacco.    CAGE Alcohol Screen:   CAGE screening score of 0 on 2024, showing low risk of alcohol abuse.      Patient Care Team:  Salima Vega MD as PCP - General (Family Medicine)  Raimundo Rodriguez MD (GASTROENTEROLOGY)    Review of Systems  GENERAL: feels well otherwise  SKIN: denies any unusual skin lesions  EYES: denies blurred vision or double vision  HEENT: denies nasal congestion, sinus pain or ST  LUNGS: denies shortness of breath with exertion  CARDIOVASCULAR: denies chest pain on exertion  GI: denies abdominal pain, denies heartburn  : denies dysuria, vaginal discharge or itching, no complaint of urinary incontinence   MUSCULOSKELETAL: denies back pain  NEURO: denies headaches  PSYCHE: denies depression or anxiety  HEMATOLOGIC: denies hx of anemia  ENDOCRINE: denies thyroid history  ALL/ASTHMA: denies hx of allergy or asthma    Objective:   Physical Exam  General Appearance:  Alert, cooperative, no distress, appears stated age   Head:  Normocephalic, without obvious abnormality, atraumatic   Eyes:  PERRL, conjunctiva/corneas clear, EOM's intact both eyes   Ears:  Normal TM's and external ear canals, both ears   Nose: Nares normal, septum midline,mucosa normal, no drainage or  sinus tenderness   Throat: Lips, mucosa, and tongue normal; teeth and gums normal   Neck: Supple, symmetrical, trachea midline, no adenopathy;  thyroid: not enlarged, symmetric, no tenderness/mass/nodules; no carotid bruit   Back:   Symmetric, no curvature, ROM normal, no CVA tenderness   Lungs:   Clear to auscultation bilaterally, respirations unlabored   Heart:  Regular rate and rhythm, S1 and S2 normal, no murmur,    Abdomen:   Soft, non-tender, bowel sounds active all four quadrants,  no masses, no organomegaly   Pelvic: Deferred  Breast exam -  no palpable masses no supraclavicular no axilla lymphadenopathy   Extremities: Extremities normal, atraumatic, no cyanosis or edema   Pulses: 2+ and symmetric   Skin: Skin color, texture, turgor normal, no rashes or lesions   Lymph nodes: Cervical, supraclavicular, and axillary nodes normal   Neurologic: Normal   Bilateral barefoot skin diabetic exam is normal, except some dryness of the feet also there is some osteoarthritis changes of the toes.    Bilateral monofilament/sensation of both feet is normal.  Pulsation pedal pulse exam of both lower legs/feet is normal as well.    /64 (BP Location: Left arm, Patient Position: Sitting, Cuff Size: adult)   Pulse 76   Temp 98 °F (36.7 °C) (Temporal)   Resp 12   Ht 5' (1.524 m)   Wt 139 lb (63 kg)   BMI 27.15 kg/m²  Estimated body mass index is 27.15 kg/m² as calculated from the following:    Height as of this encounter: 5' (1.524 m).    Weight as of this encounter: 139 lb (63 kg).    Medicare Hearing Assessment:   Hearing Screening    Time taken: 2/13/2024  9:28 AM  Screening Method: Finger Rub               Assessment & Plan:   Massiel Anders is a 73 year old female who presents for a Medicare Assessment.     1. Encounter for annual health examination (Primary)  2. Uncontrolled type 2 diabetes mellitus with hyperglycemia, without long-term current use of insulin (HCC)  -     Hemoglobin A1C; Future; Expected  date: 02/13/2024  -     Microalb/Creat Ratio, Random Urine; Future; Expected date: 02/13/2024  -     OPHTHALMOLOGY - INTERNAL  -     Rybelsus; Take 1 tablet by mouth daily.  Dispense: 30 tablet; Refill: 1  3. Hyperlipidemia, mixed  -     Comp Metabolic Panel (14); Future; Expected date: 02/13/2024  -     Lipid Panel; Future; Expected date: 02/13/2024  -     Rosuvastatin Calcium; Take 1 tablet (10 mg total) by mouth daily.  Dispense: 90 tablet; Refill: 1  4. Screening mammogram for breast cancer  -     [facility] SRIDHAR 2D+3D SCREENING BILAT (CPT=77067/17675); Future; Expected date: 02/13/2024  5. Heartburn  -     Famotidine; Take 1 tablet (40 mg total) by mouth 2 (two) times daily.  Dispense: 180 tablet; Refill: 1  6. Asymptomatic carotid artery narrowing without infarction, left  -     US CAROTID DOPPLER BILAT - DIAG IMG (CPT=93880); Future; Expected date: 02/13/2024  7. Postmenopausal  -     XR DEXA BONE DENSITOMETRY (CPT=77080); Future; Expected date: 02/13/2024  8. Vitamin D deficiency  9. Type 2 diabetes mellitus with hypercholesterolemia  (HCC)     Diabetes type 2 continue monitoring blood work patient will return for testing continue current medications refills were given today.  Patient is due for eye examination denies any numbness or tingling of the feet. Further recommendation pending test results.    Hyperlipidemia on medication continue medication monitor blood work.    Heartburn on famotidine and continue present management.    Breast cancer screening mammogram ordered breast examination normal today.    Osteopenia check bone density scan monitor.    Left carotid stenosis check ultrasound monitor.    Vitamin D deficiency continue supplementation monitor blood work.    Screening for colon cancer colonoscopy is up-to-date.    Postmenopausal check DEXA scan.      Shingles vaccination.  RSV shot.  Increase Rybelsus to 7 mg 1 tablet daily.  If you notice that your a.m. sugars start running above 130 call office we  can increase the medication after using  Rybelsus for 1 month.  Watch diet for carbs have less bread, less pasta, less rice less potatoes and sweets.  Low-fat diet.  Call [phone] to schedule Mammogram and U carotids.   Call [phone] to schedule DEXA scan after May 18, 2024.  Continue other medications.  Do fasting blood work after May 12, 2024 prior next visit.    The patient indicates understanding of these issues and agrees to the plan.  Further testing ordered.  Imaging studies ordered.  Lab work ordered.  Reinforced healthy diet, lifestyle, and exercise.      No follow-ups on file.     Salima Vega MD, 3/9/2022     Supplementary Documentation:   General Health:  In the past six months, have you lost more than 10 pounds without trying?: 1 - Yes  Has your appetite been poor?: No  Type of Diet: Diabetic  How does the patient maintain a good energy level?: Daily Walks  How would you describe your daily physical activity?: Moderate  How would you describe your current health state?: Good  How do you maintain positive mental well-being?: Puzzles;Games;Visiting Family  On a scale of 0 to 10, with 0 being no pain and 10 being severe pain, what is your pain level?: 0 - (None)  In the past six months, have you experienced urine leakage?: 0-No  At any time do you feel concerned for the safety/well-being of yourself and/or your children, in your home or elsewhere?: No  Have you had any immunizations at another office such as Influenza, Hepatitis B, Tetanus, or Pneumococcal?: Yes       Massiel Anders's SCREENING SCHEDULE   Tests on this list are recommended by your physician but may not be covered, or covered at this frequency, by your insurer.   Please check with your insurance carrier before scheduling to verify coverage.   PREVENTATIVE SERVICES FREQUENCY &  COVERAGE DETAILS LAST COMPLETION DATE   Diabetes Screening    Fasting Blood Sugar /  Glucose    One screening every 12 months if never tested or  if previously tested but not diagnosed with pre-diabetes   One screening every 6 months if diagnosed with pre-diabetes Lab Results   Component Value Date     (H) 02/12/2024        Cardiovascular Disease Screening    Lipid Panel  Cholesterol  Lipoprotein (HDL)  Triglycerides Covered every 5 years for all Medicare beneficiaries without apparent signs or symptoms of cardiovascular disease Lab Results   Component Value Date    CHOLEST 186 02/12/2024    HDL 65 (H) 02/12/2024     (H) 02/12/2024    TRIG 60 02/12/2024         Electrocardiogram (EKG)   Covered if needed at Welcome to Medicare, and non-screening if indicated for medical reasons 01/20/2016      Ultrasound Screening for Abdominal Aortic Aneurysm (AAA) Covered once in a lifetime for one of the following risk factors   • Men who are 65-75 years old and have ever smoked   • Anyone with a family history -     Colorectal Cancer Screening  Covered for ages 50-85; only need ONE of the following:    Colonoscopy   Covered every 10 years    Covered every 2 years if patient is at high risk or previous colonoscopy was abnormal 07/22/2022    Health Maintenance   Topic Date Due   • Colorectal Cancer Screening  07/22/2029       Flexible Sigmoidoscopy   Covered every 4 years -    Fecal Occult Blood Test Covered annually -   Bone Density Screening    Bone density screening    Covered every 2 years after age 65 if diagnosed with risk of osteoporosis or estrogen deficiency.    Covered yearly for long-term glucocorticoid medication use (Steroids) Last Dexa Scan:    XR DEXA BONE DENSITOMETRY (CPT=77080) 05/18/2022      No recommendations at this time   Pap and Pelvic    Pap   Covered every 2 years for women at normal risk; Annually if at high risk -  No recommendations at this time    Chlamydia Annually if high risk -  No recommendations at this time   Screening Mammogram    Mammogram     Recommend annually for all female patients aged 40 and older    One baseline  mammogram covered for patients aged 35-39 05/17/2022    Health Maintenance   Topic Date Due   • Mammogram  05/17/2023       Immunizations    Influenza Covered once per flu season  Please get every year 11/20/2023  No recommendations at this time    Pneumococcal Each vaccine ([id number] & [id number]) covered once after 65 Prevnar 13: 01/18/2016    [id number]: 02/13/2017     No recommendations at this time    Hepatitis B One screening covered for patients with certain risk factors   07/25/2022  No recommendations at this time    Tetanus Toxoid Not covered by Medicare Part B unless medically necessary (cut with metal); may be covered with your pharmacy prescription benefits -    Tetanus, Diptheria and Pertusis TD and TDaP Not covered by Medicare Part B -  No recommendations at this time    Zoster Not covered by Medicare Part B; may be covered with your pharmacy  prescription benefits 01/05/2023  Zoster Vaccines(2 of 3) due on 03/02/2023       Diabetes      Hemoglobin A1C Annually; if last result is elevated, may be asked to retest more frequently.    Medicare covers every 3 months Lab Results   Component Value Date     (H) 02/12/2024    A1C 7.3 (H) 02/12/2024       No recommendations at this time    Creat/alb ratio Annually Lab Results   Component Value Date    MICROALBCREA 7.3 02/12/2024       LDL Annually Lab Results   Component Value Date     (H) 02/12/2024       Dilated Eye Exam Annually Last Diabetic Eye Exam:  Last Dilated Eye Exam: 02/13/24  Eye Exam shows Diabetic Retinopathy?: No

## 2024-02-14 ENCOUNTER — MED REC SCAN ONLY (OUTPATIENT)
Dept: FAMILY MEDICINE CLINIC | Facility: CLINIC | Age: 74
End: 2024-02-14

## 2024-04-22 ENCOUNTER — TELEPHONE (OUTPATIENT)
Dept: FAMILY MEDICINE CLINIC | Facility: CLINIC | Age: 74
End: 2024-04-22

## 2024-04-22 DIAGNOSIS — E11.65 UNCONTROLLED TYPE 2 DIABETES MELLITUS WITH HYPERGLYCEMIA, WITHOUT LONG-TERM CURRENT USE OF INSULIN (HCC): ICD-10-CM

## 2024-04-22 RX ORDER — ORAL SEMAGLUTIDE 7 MG/1
1 TABLET ORAL DAILY
Qty: 30 TABLET | Refills: 0 | Status: SHIPPED | OUTPATIENT
Start: 2024-04-22 | End: 2024-05-22

## 2024-04-22 NOTE — TELEPHONE ENCOUNTER
Rx refill     Semaglutide (RYBELSUS) 7 MG Oral Tab     Send to   [facility] - [place], [place] - [address] [facility] [phone], [phone]

## 2024-05-20 ENCOUNTER — HOSPITAL ENCOUNTER (OUTPATIENT)
Dept: ULTRASOUND IMAGING | Age: 74
Discharge: HOME | End: 2024-05-20
Attending: FAMILY MEDICINE

## 2024-05-20 ENCOUNTER — HOSPITAL ENCOUNTER (OUTPATIENT)
Dept: BONE DENSITY | Age: 74
Discharge: HOME | End: 2024-05-20
Attending: FAMILY MEDICINE

## 2024-05-20 ENCOUNTER — HOSPITAL ENCOUNTER (OUTPATIENT)
Dept: MAMMOGRAPHY | Age: 74
Discharge: HOME | End: 2024-05-20
Attending: FAMILY MEDICINE

## 2024-05-20 ENCOUNTER — LAB ENCOUNTER (OUTPATIENT)
Dept: LAB | Age: 74
End: 2024-05-20
Attending: FAMILY MEDICINE

## 2024-05-20 ENCOUNTER — OFFICE VISIT (OUTPATIENT)
Dept: FAMILY MEDICINE CLINIC | Facility: CLINIC | Age: 74
End: 2024-05-20

## 2024-05-20 VITALS
HEART RATE: 72 BPM | BODY MASS INDEX: 26.15 KG/M2 | TEMPERATURE: 97 F | RESPIRATION RATE: 14 BRPM | WEIGHT: 133.19 LBS | DIASTOLIC BLOOD PRESSURE: 63 MMHG | SYSTOLIC BLOOD PRESSURE: 125 MMHG | HEIGHT: 60 IN

## 2024-05-20 DIAGNOSIS — Z12.31 SCREENING MAMMOGRAM FOR BREAST CANCER: ICD-10-CM

## 2024-05-20 DIAGNOSIS — E11.65 UNCONTROLLED TYPE 2 DIABETES MELLITUS WITH HYPERGLYCEMIA, WITHOUT LONG-TERM CURRENT USE OF INSULIN (HCC): Primary | ICD-10-CM

## 2024-05-20 DIAGNOSIS — R12 HEARTBURN: ICD-10-CM

## 2024-05-20 DIAGNOSIS — I65.22 ASYMPTOMATIC CAROTID ARTERY NARROWING WITHOUT INFARCTION, LEFT: ICD-10-CM

## 2024-05-20 DIAGNOSIS — E78.2 HYPERLIPIDEMIA, MIXED: ICD-10-CM

## 2024-05-20 DIAGNOSIS — R41.3 MEMORY PROBLEM: ICD-10-CM

## 2024-05-20 DIAGNOSIS — M85.859 OSTEOPENIA OF HIP, UNSPECIFIED LATERALITY: ICD-10-CM

## 2024-05-20 DIAGNOSIS — Z78.0 POSTMENOPAUSAL: ICD-10-CM

## 2024-05-20 DIAGNOSIS — E78.00 TYPE 2 DIABETES MELLITUS WITH HYPERCHOLESTEROLEMIA (HCC): ICD-10-CM

## 2024-05-20 DIAGNOSIS — E11.69 TYPE 2 DIABETES MELLITUS WITH HYPERCHOLESTEROLEMIA (HCC): ICD-10-CM

## 2024-05-20 DIAGNOSIS — E11.65 UNCONTROLLED TYPE 2 DIABETES MELLITUS WITH HYPERGLYCEMIA, WITHOUT LONG-TERM CURRENT USE OF INSULIN (HCC): ICD-10-CM

## 2024-05-20 DIAGNOSIS — M85.88 OSTEOPENIA OF LUMBAR SPINE: ICD-10-CM

## 2024-05-20 DIAGNOSIS — M85.859 OSTEOPENIA OF NECK OF FEMUR, UNSPECIFIED LATERALITY: ICD-10-CM

## 2024-05-20 LAB
ALBUMIN SERPL-MCNC: 3.6 G/DL (ref 3.4–5)
ALBUMIN/GLOB SERPL: 1.1 {RATIO} (ref 1–2)
ALP LIVER SERPL-CCNC: 97 U/L
ALT SERPL-CCNC: 29 U/L
ANION GAP SERPL CALC-SCNC: 8 MMOL/L (ref 0–18)
AST SERPL-CCNC: 15 U/L (ref 15–37)
BILIRUB SERPL-MCNC: 0.5 MG/DL (ref 0.1–2)
BUN BLD-MCNC: 11 MG/DL (ref 9–23)
CALCIUM BLD-MCNC: 9.4 MG/DL (ref 8.5–10.1)
CHLORIDE SERPL-SCNC: 109 MMOL/L (ref 98–112)
CHOLEST SERPL-MCNC: 149 MG/DL (ref ?–200)
CO2 SERPL-SCNC: 22 MMOL/L (ref 21–32)
CREAT BLD-MCNC: 0.64 MG/DL
CREAT UR-SCNC: 77.9 MG/DL
EGFRCR SERPLBLD CKD-EPI 2021: 92 ML/MIN/1.73M2 (ref 60–?)
EST. AVERAGE GLUCOSE BLD GHB EST-MCNC: 171 MG/DL (ref 68–126)
FASTING PATIENT LIPID ANSWER: YES
FASTING STATUS PATIENT QL REPORTED: YES
GLOBULIN PLAS-MCNC: 3.4 G/DL (ref 2.8–4.4)
GLUCOSE BLD-MCNC: 171 MG/DL (ref 70–99)
HBA1C MFR BLD: 7.6 % (ref ?–5.7)
HDLC SERPL-MCNC: 60 MG/DL (ref 40–59)
LDLC SERPL CALC-MCNC: 62 MG/DL (ref ?–100)
MICROALBUMIN UR-MCNC: 0.56 MG/DL
MICROALBUMIN/CREAT 24H UR-RTO: 7.2 UG/MG (ref ?–30)
NONHDLC SERPL-MCNC: 89 MG/DL (ref ?–130)
OSMOLALITY SERPL CALC.SUM OF ELEC: 291 MOSM/KG (ref 275–295)
POTASSIUM SERPL-SCNC: 4.1 MMOL/L (ref 3.5–5.1)
PROT SERPL-MCNC: 7 G/DL (ref 6.4–8.2)
SODIUM SERPL-SCNC: 139 MMOL/L (ref 136–145)
TRIGL SERPL-MCNC: 159 MG/DL (ref 30–149)
VLDLC SERPL CALC-MCNC: 24 MG/DL (ref 0–30)

## 2024-05-20 PROCEDURE — 36415 COLL VENOUS BLD VENIPUNCTURE: CPT

## 2024-05-20 PROCEDURE — 77063 BREAST TOMOSYNTHESIS BI: CPT | Performed by: FAMILY MEDICINE

## 2024-05-20 PROCEDURE — 82043 UR ALBUMIN QUANTITATIVE: CPT

## 2024-05-20 PROCEDURE — 82570 ASSAY OF URINE CREATININE: CPT

## 2024-05-20 PROCEDURE — 99214 OFFICE O/P EST MOD 30 MIN: CPT | Performed by: FAMILY MEDICINE

## 2024-05-20 PROCEDURE — 77080 DXA BONE DENSITY AXIAL: CPT | Performed by: FAMILY MEDICINE

## 2024-05-20 PROCEDURE — 83036 HEMOGLOBIN GLYCOSYLATED A1C: CPT

## 2024-05-20 PROCEDURE — 80061 LIPID PANEL: CPT

## 2024-05-20 PROCEDURE — 77067 SCR MAMMO BI INCL CAD: CPT | Performed by: FAMILY MEDICINE

## 2024-05-20 PROCEDURE — G2211 COMPLEX E/M VISIT ADD ON: HCPCS | Performed by: FAMILY MEDICINE

## 2024-05-20 PROCEDURE — 80053 COMPREHEN METABOLIC PANEL: CPT

## 2024-05-20 PROCEDURE — 93880 EXTRACRANIAL BILAT STUDY: CPT | Performed by: FAMILY MEDICINE

## 2024-05-20 RX ORDER — EMPAGLIFLOZIN 25 MG/1
25 TABLET, FILM COATED ORAL DAILY
Qty: 90 TABLET | Refills: 0 | Status: CANCELLED | OUTPATIENT
Start: 2024-05-20

## 2024-05-20 RX ORDER — ROSUVASTATIN CALCIUM 10 MG/1
10 TABLET, COATED ORAL DAILY
Qty: 90 TABLET | Refills: 1 | Status: SHIPPED | OUTPATIENT
Start: 2024-05-20

## 2024-05-20 RX ORDER — ORAL SEMAGLUTIDE 14 MG/1
1 TABLET ORAL DAILY
Qty: 90 TABLET | Refills: 1 | Status: SHIPPED | OUTPATIENT
Start: 2024-05-20

## 2024-05-20 RX ORDER — PIOGLITAZONEHYDROCHLORIDE 30 MG/1
30 TABLET ORAL DAILY
Qty: 90 TABLET | Refills: 0 | Status: CANCELLED | OUTPATIENT
Start: 2024-05-20

## 2024-05-20 RX ORDER — ROSUVASTATIN CALCIUM 10 MG/1
10 TABLET, COATED ORAL DAILY
Qty: 90 TABLET | Refills: 1 | Status: SHIPPED | OUTPATIENT
Start: 2024-05-20 | End: 2024-05-20

## 2024-05-20 RX ORDER — FAMOTIDINE 40 MG/1
40 TABLET, FILM COATED ORAL 2 TIMES DAILY
Qty: 180 TABLET | Refills: 1 | Status: SHIPPED | OUTPATIENT
Start: 2024-05-20

## 2024-05-20 RX ORDER — ORAL SEMAGLUTIDE 14 MG/1
1 TABLET ORAL DAILY
Qty: 90 TABLET | Refills: 1 | Status: SHIPPED | OUTPATIENT
Start: 2024-05-20 | End: 2024-05-20

## 2024-05-20 RX ORDER — ORAL SEMAGLUTIDE 7 MG/1
1 TABLET ORAL DAILY
Qty: 90 TABLET | Refills: 1 | Status: CANCELLED | OUTPATIENT
Start: 2024-05-20 | End: 2024-06-19

## 2024-05-20 NOTE — PATIENT INSTRUCTIONS
Increase Rybelsus to 14 mg daily.  Start Januvia 100 mg 1 tablet daily.  Continue rosuvastatin at current dose  Watch diet for fats and carbs.   Stay active.    See neurologist for evaluation.  Do fasting blood tests after August 20, 2024 and schedule follow-up visit to go over results.    Dear Patients,  At [facility], patient care is our priority. Please review and acknowledge our office refill policy so we can best serve you.  EMG 36 REFILL POLICY  Allow 3 business days for refills; controlled substances may take longer.  Contact your pharmacy at least 5-7 business days prior to running out of medication and have them send an electronic request or submit through the \"request refill\" option thru your Outlisten account. No need to do both, as multiple requests will create an automated Outlisten message to notify of a denial for one of the duplicated requests, causing you undue confusion.   Refills are NOT addressed on weekends; covering physicians do not authorize routine medications on weekends.  No narcotics or controlled substances are refilled after noon on Fridays or by on call physicians.  By law, narcotics cannot be faxed or phoned into your pharmacy.  If your prescription is due for a refill, you may be due for a follow up appointment. Please call our office at [phone] to make an appointment or schedule an appointment via Outlisten.  To best provide you care, patients receiving routine medications need to be seen at least twice a year. Patients receiving narcotic/controlled substance medications need to be seen at least once every 3 months.  In the event that your preferred pharmacy does not have the requested medication in stock (ie Backordered), it is your responsibility to find another pharmacy that has the requested medication available. We will gladly send a new prescription to that pharmacy at your request.  controlled substances may not be able to be filled out of state due to  license restrictions.  If you have a planned trip, it's best to call your pharmacy at least 5-7 business days to prevent any delays in your medication refill.    Thank you for your understanding of this important matter.  Sincerely,    [facility], [address] [place] [id number]  Dept: [phone]

## 2024-05-21 NOTE — PROGRESS NOTES
Massiel Anders is a 74 year old female.  Diabetes, hypercholesterolemia, GERD, memory problem postmenopausal, osteopenia lumbar spine femoral neck and hip  HPI:   Diabetes hemoglobin A1c 7.6 higher than previously patient is taking Rybelsus 7 mg daily will increase medication.  She is trying to watch diet.  She has a sweet tooth.  She will try to modify diet.  Had eye examination done this year denies any numbness or tingling of the feet.  Will also restart patient on Januvia.  Increase Rybelsus to 14 mg daily.    Hypercholesterolemia patient is taking rosuvastatin 10 mg daily doing well with medication.  Continue current medication watching diet    GERD taking famotidine stable asymptomatic    Complains of having memory problems has problem to find the names.  Also trying to figure out her son school she had a hard time 1 time.  Concerned with that.  Refer patient to neurologist for evaluation.  Never lost when driving.    Postmenopausal with osteopenia of the lumbar spine femoral neck and hip.  Patient will start taking calcium and vitamin D supplementation.  Increase activity.  Check DEXA scan in 2 years.    Left carotid artery atherosclerosis ultrasound was recently done will monitor.  Stay on calcium vitamin D supplementation.  She was on aspirin but could not tolerate it because of the nosebleeds she will try to do it every other day.      Current Outpatient Medications   Medication Sig Dispense Refill   • Semaglutide (RYBELSUS) 14 MG Oral Tab Take 1 tablet by mouth daily. 90 tablet 1   • SITagliptin Phosphate 100 MG Oral Tab Take 1 tablet (100 mg total) by mouth daily. 90 tablet 1   • rosuvastatin 10 MG Oral Tab Take 1 tablet (10 mg total) by mouth daily. 90 tablet 1   • famotidine 40 MG Oral Tab Take 1 tablet (40 mg total) by mouth 2 (two) times daily. 180 tablet 1   • FreeStyle Lancets Does not apply Misc Test  3times daily 100 each 2   • Glucose Blood In Vitro Strip Test 3 times daily 100 each 2       Past Medical History:   • Allergic rhinitis   • Arthritis   • Chronic cough    frequent several times a year   • Diabetes (HCC)   • Esophageal reflux   • Heartburn   • History of cardiac murmur   • Leaking of urine   • Obesity   • Sleep apnea   • Sleep disturbance      Social History:  Social History     Socioeconomic History   • Marital status:    Tobacco Use   • Smoking status: Never   • Smokeless tobacco: Never   Vaping Use   • Vaping status: Never Used   Substance and Sexual Activity   • Alcohol use: Yes     Alcohol/week: 2.0 standard drinks of alcohol     Types: 2 Glasses of wine per week   • Drug use: No   • Sexual activity: Yes     Partners: Male   Other Topics Concern   • Caffeine Concern No   • Stress Concern No   • Weight Concern Yes   • Special Diet No   • Exercise Yes   • Seat Belt Yes   • Self-Exams No        REVIEW OF SYSTEMS:   GENERAL HEALTH: feels well otherwise  SKIN: denies any unusual skin lesions or rashes  HEENT no congestion no runny nose no sore throat  Neck no neck pain  RESPIRATORY: denies shortness of breath with exertion  CARDIOVASCULAR: denies chest pain on exertion  GI: denies abdominal pain and denies heartburn  NEURO: denies headaches  Psych normal mood.    EXAM:   /63 (BP Location: Left arm, Patient Position: Sitting, Cuff Size: adult)   Pulse 72   Temp 97 °F (36.1 °C) (Temporal)   Resp 14   Ht 5' (1.524 m)   Wt 133 lb 3.2 oz (60.4 kg)   BMI 26.01 kg/m²   GENERAL: well developed, well nourished,in no apparent distress  SKIN: no rashes,no suspicious lesions  HEENT: atraumatic, normocephalic,ears and throat are clear  NECK: supple,no adenopathy  LUNGS: clear to auscultation  CARDIO: RRR normal S1-S2, 1 out of 6 systolic ejection murmur  GI: good BS's,no masses, HSM or tenderness  EXTREMITIES: no edema  Psychiatric - alert  and oriented x3, normal mood     PROCEDURE:  XR DEXA BONE DENSITOMETRY (CPT=77080)       COMPARISON:  [address], XR, XR DEXA BONE DENSITOMETRY  (CPT=77080), 5/18/2022, 12:40 PM.       INDICATIONS:  Z78.0 Postmenopausal female with history of osteopenia.       PATIENT STATED HISTORY: (As transcribed by Technologist)   Postmenopausal            LUMBAR SPINE ANALYSIS RESULTS:       Bone mineral density (BMD) (g/cm2):  0.871     Lumbar T-Score:  -1.6       % young normals:  83       % age matched controls:  111       Change from prior spine examination:  0.3%                TOTAL HIP ANALYSIS RESULTS:         Bone mineral density (BMD) (g/cm2):  0.779       Total Hip T-Score:  -1.4       % young normals:  81       % age matched controls:  108       Change from prior hip examination:  -4.4%                FEMORAL NECK ANALYSIS RESULTS:         Bone mineral density (BMD) (g/cm2):  0.598       Femoral neck T-Score:  -2.3       % young normals:  68       % age matched controls:  95       Change from prior hip examination:  0.9%               ADDITIONAL FINDINGS:  No significant additional findings.                          Impression   CONCLUSION:  A BMD is compatible with the WHO classification of osteopenia.  There has been statistically significant decrease in BMD of the left hip relative to the previous exam.  The 10 year risk for major osteoporotic fracture and hip fracture are   15% and 4.2% respectively.       Recommendation:  The National Osteoporosis Foundation (NOF) recommends pharmacological treatment for patients with a FRAX 10-year risk score of 3% or higher for a hip fracture or 20% or higher for a major osteoporotic fracture, to prevent osteoporosis   and reduce fracture risk.       The World Health Organization has defined the following categories based on bone density:   Normal bone:  T-score greater than or equal to -1   Osteopenia: T-score  less than -1 and greater  than -2.5   Osteoporosis:  T-score less than or equal -2.5           LOCATION:  [facility]                   Dictated by (CST): Reynold Juarez DO on 5/20/2024 at 11:47 AM       Finalized  by (CST): Reynold Juarez  on 5/20/2024 at 11:48 AM            Results for orders placed or performed in visit on 05/20/24   Comp Metabolic Panel (14)   Result Value Ref Range    Glucose 171 (H) 70 - 99 mg/dL    Sodium 139 136 - 145 mmol/L    Potassium 4.1 3.5 - 5.1 mmol/L    Chloride 109 98 - 112 mmol/L    CO2 22.0 21.0 - 32.0 mmol/L    Anion Gap 8 0 - 18 mmol/L    BUN 11 9 - 23 mg/dL    Creatinine 0.64 0.55 - 1.02 mg/dL    Calcium, Total 9.4 8.5 - 10.1 mg/dL    Calculated Osmolality 291 275 - 295 mOsm/kg    eGFR-Cr 92 >=60 mL/min/1.73m2    AST 15 15 - 37 U/L    ALT 29 13 - 56 U/L    Alkaline Phosphatase 97 55 - 142 U/L    Bilirubin, Total 0.5 0.1 - 2.0 mg/dL    Total Protein 7.0 6.4 - 8.2 g/dL    Albumin 3.6 3.4 - 5.0 g/dL    Globulin  3.4 2.8 - 4.4 g/dL    A/G Ratio 1.1 1.0 - 2.0    Patient Fasting for CMP? Yes    Hemoglobin A1C   Result Value Ref Range    HgbA1C 7.6 (H) <5.7 %    Estimated Average Glucose 171 (H) 68 - 126 mg/dL   Lipid Panel   Result Value Ref Range    Cholesterol, Total 149 <200 mg/dL    HDL Cholesterol 60 (H) 40 - 59 mg/dL    Triglycerides 159 (H) 30 - 149 mg/dL    LDL Cholesterol 62 <100 mg/dL    VLDL 24 0 - 30 mg/dL    Non HDL Chol 89 <130 mg/dL    Patient Fasting for Lipid? Yes    Microalb/Creat Ratio, Random Urine   Result Value Ref Range    Microalbumin, Urine 0.56 mg/dL    Creatinine Ur Random 77.90 mg/dL    Malb/Cre Calc 7.2 <=30.0 ug/mg     PROCEDURE:  US CAROTID DOPPLER BILAT - DIAG IMG (CPT=93880)       COMPARISON:  None.       INDICATIONS:  I65.22 Asymptomatic carotid artery narrowing without infarction, left       TECHNIQUE:  Real-time gray-scale and duplex ultrasound was used to evaluate the bilateral extracranial carotid and vertebral arteries.  B-mode 2-dimensional images of the vascular structures, Doppler spectral analysis, and color flow Doppler imaging were    performed.  Stenosis measurements obtained in this exam were performed using Consensus Panel categories and NASCET  criteria       PATIENT STATED HISTORY: (As transcribed by Technologist)              FINDINGS:         IMAGE FINDINGS:  There is mixed soft and calcified plaque in distal common and proximal internal carotid arteries bilaterally.  On the left there is mild plaque ulceration.  By grayscale and Doppler imaging there is no evidence of significant stenosis.       HEART RATE:      Regular.       VELOCITY MEASUREMENTS:                                      Right CCA Peak Systolic Velocity:  57.40 cm/s               Right Proximal ICA Peak Systolic Velocity:  42.31 cm/s               Right Mid ICA Peak Systolic Velocity:  39.35 cm/s               Right Distal ICA Peak Systolic Velocity:  47.24 cm/s               Right ICA/CCA Velocity Ratio:  0.82                                Left CCA Peak Systolic Velocity:  57.40 cm/s               Left Proximal ICA Peak Systolic Velocity:  40.96 cm/s               Left Mid ICA Peak Systolic Velocity:  48.27 cm/s               Left Distal ICA Peak Systolic Velocity:  51.14 cm/s               Left ICA/CCA Velocity Ratio:  0.89       The vertebral arteries demonstrate antegrade flow.                        Impression   CONCLUSION:  There is atherosclerotic plaque noted bilaterally.  There is mild plaque ulceration on the left.  There is no grayscale or Doppler evidence of significant stenosis.           LOCATION:  [facility]                   Dictated by (CST): Davonte Mock MD on 5/20/2024 at 1:04 PM       Finalized by (CST): Davonte Mcok MD on 5/20/2024 at 1:05 PM          ASSESSMENT AND PLAN:     Encounter Diagnoses   Name Primary?   • Uncontrolled type 2 diabetes mellitus with hyperglycemia, without long-term current use of insulin (HCC) Yes   • Hyperlipidemia, mixed    • Heartburn    • Memory problem    • Asymptomatic carotid artery narrowing without infarction, left    • Postmenopausal    • Type 2 diabetes mellitus with hypercholesterolemia (HCC)    • Osteopenia of lumbar spine    •  Osteopenia of hip, unspecified laterality    • Osteopenia of neck of femur, unspecified laterality    Uncontrolled diabetes increase Rybelsus to 14 mg daily start Januvia monitor low-carb diet    Diabetes with hypercholesterolemia as above.    Hyperlipidemia on rosuvastatin continue present management monitor blood work    Heartburn on famotidine continue medication stable watch diet    Memory problem refer to  neurologist for evaluation.    Atherosclerosis/narrowingLeft carotid start taking aspirin every other day monitor.    Postmenopausal/osteopenia of the lumbar/osteopenia of the femoral neck/osteopenia of the hip stay calcium vitamin D supplementation weightbearing exercise        No orders of the defined types were placed in this encounter.      Meds & Refills for this Visit:  Requested Prescriptions     Signed Prescriptions Disp Refills   • Semaglutide (RYBELSUS) 14 MG Oral Tab 90 tablet 1     Sig: Take 1 tablet by mouth daily.   • SITagliptin Phosphate 100 MG Oral Tab 90 tablet 1     Sig: Take 1 tablet (100 mg total) by mouth daily.   • rosuvastatin 10 MG Oral Tab 90 tablet 1     Sig: Take 1 tablet (10 mg total) by mouth daily.   • famotidine 40 MG Oral Tab 180 tablet 1     Sig: Take 1 tablet (40 mg total) by mouth 2 (two) times daily.       Increase Rybelsus to 14 mg daily.  Start Januvia 100 mg 1 tablet daily.  Continue rosuvastatin at current dose  Watch diet for fats and carbs.   Stay active.    See neurologist for evaluation.  Do fasting blood tests after August 20, 2024 and schedule follow-up visit to go over results.    Imaging & Consults:  NEURO - INTERNAL    The patient indicates understanding of these issues and agrees to the plan.  The patient is asked to return in August for medication check.  The note was dictated using speech recognition software.  Accuracy and grammar in transcription may be subject to error.

## 2024-07-25 DIAGNOSIS — E78.2 HYPERLIPIDEMIA, MIXED: ICD-10-CM

## 2024-07-25 DIAGNOSIS — E11.65 UNCONTROLLED TYPE 2 DIABETES MELLITUS WITH HYPERGLYCEMIA, WITHOUT LONG-TERM CURRENT USE OF INSULIN (HCC): ICD-10-CM

## 2024-07-25 RX ORDER — ROSUVASTATIN CALCIUM 10 MG/1
10 TABLET, COATED ORAL DAILY
Qty: 90 TABLET | Refills: 1 | OUTPATIENT
Start: 2024-07-25

## 2024-07-25 RX ORDER — ORAL SEMAGLUTIDE 14 MG/1
1 TABLET ORAL DAILY
Qty: 90 TABLET | Refills: 1 | OUTPATIENT
Start: 2024-07-25

## 2024-07-25 NOTE — TELEPHONE ENCOUNTER
Medications sent in on 5/20/2024 for 90 days and one refill. Patient should have enough until 10/20/2024. Denied medication due to it being too soon to refill.   [employer] Mail message sent to patient informing of the refill that should be at the pharmacy.

## 2024-11-27 DIAGNOSIS — E11.65 UNCONTROLLED TYPE 2 DIABETES MELLITUS WITH HYPERGLYCEMIA, WITHOUT LONG-TERM CURRENT USE OF INSULIN (HCC): ICD-10-CM

## 2024-11-27 DIAGNOSIS — E78.2 HYPERLIPIDEMIA, MIXED: ICD-10-CM

## 2024-11-29 NOTE — TELEPHONE ENCOUNTER
Please call patient to schedule a med check, then route to Dr. Vega. Thank you!      Last Office Visit: 5/20/24  Last Refill: 5/20/24  Return to Clinic: August 2024   Protocol: failed  Passed- rosuvastatin   NOV: 3/11/25  Requested Prescriptions     Pending Prescriptions Disp Refills    Semaglutide (RYBELSUS) 14 MG Oral Tab 90 tablet 0     Sig: Take 1 tablet by mouth daily.    SITagliptin Phosphate 100 MG Oral Tab 90 tablet 0     Sig: Take 1 tablet (100 mg total) by mouth daily.    rosuvastatin 10 MG Oral Tab 90 tablet 0     Sig: Take 1 tablet (10 mg total) by mouth daily.         Please approve if appropriate.     Thank you!

## 2024-12-02 NOTE — TELEPHONE ENCOUNTER
Patient has appointment scheduled for 3/11/2025. Patient would like to if she can get a refill before then.

## 2024-12-03 RX ORDER — ROSUVASTATIN CALCIUM 10 MG/1
10 TABLET, COATED ORAL DAILY
Qty: 90 TABLET | Refills: 0 | Status: SHIPPED | OUTPATIENT
Start: 2024-12-03

## 2024-12-03 RX ORDER — ORAL SEMAGLUTIDE 14 MG/1
1 TABLET ORAL DAILY
Qty: 90 TABLET | Refills: 0 | Status: SHIPPED | OUTPATIENT
Start: 2024-12-03

## 2025-03-05 ENCOUNTER — MED REC SCAN ONLY (OUTPATIENT)
Dept: FAMILY MEDICINE CLINIC | Facility: CLINIC | Age: 75
End: 2025-03-05

## 2025-03-06 ENCOUNTER — OFFICE VISIT (OUTPATIENT)
Dept: FAMILY MEDICINE CLINIC | Facility: CLINIC | Age: 75
End: 2025-03-06
Payer: MEDICARE

## 2025-03-06 ENCOUNTER — HOSPITAL ENCOUNTER (OUTPATIENT)
Dept: GENERAL RADIOLOGY | Age: 75
Discharge: HOME | End: 2025-03-06
Attending: NURSE PRACTITIONER
Payer: MEDICARE

## 2025-03-06 ENCOUNTER — TELEPHONE (OUTPATIENT)
Dept: FAMILY MEDICINE CLINIC | Facility: CLINIC | Age: 75
End: 2025-03-06

## 2025-03-06 VITALS
WEIGHT: 121 LBS | BODY MASS INDEX: 24 KG/M2 | HEART RATE: 89 BPM | DIASTOLIC BLOOD PRESSURE: 60 MMHG | OXYGEN SATURATION: 98 % | TEMPERATURE: 99 F | SYSTOLIC BLOOD PRESSURE: 120 MMHG | RESPIRATION RATE: 16 BRPM

## 2025-03-06 DIAGNOSIS — R05.1 ACUTE COUGH: Primary | ICD-10-CM

## 2025-03-06 DIAGNOSIS — E11.65 UNCONTROLLED TYPE 2 DIABETES MELLITUS WITH HYPERGLYCEMIA, WITHOUT LONG-TERM CURRENT USE OF INSULIN (HCC): ICD-10-CM

## 2025-03-06 DIAGNOSIS — R05.1 ACUTE COUGH: ICD-10-CM

## 2025-03-06 LAB
OPERATOR ID: NORMAL
RAPID SARS-COV-2 BY PCR: NOT DETECTED

## 2025-03-06 PROCEDURE — U0002 COVID-19 LAB TEST NON-CDC: HCPCS | Performed by: NURSE PRACTITIONER

## 2025-03-06 PROCEDURE — 71046 X-RAY EXAM CHEST 2 VIEWS: CPT | Performed by: NURSE PRACTITIONER

## 2025-03-06 PROCEDURE — 99214 OFFICE O/P EST MOD 30 MIN: CPT | Performed by: NURSE PRACTITIONER

## 2025-03-06 RX ORDER — PREDNISONE 20 MG/1
40 TABLET ORAL DAILY
Qty: 10 TABLET | Refills: 0 | Status: SHIPPED | OUTPATIENT
Start: 2025-03-06 | End: 2025-03-11

## 2025-03-06 RX ORDER — ALBUTEROL SULFATE 90 UG/1
2 INHALANT RESPIRATORY (INHALATION)
Qty: 1 EACH | Refills: 0 | Status: SHIPPED | OUTPATIENT
Start: 2025-03-06

## 2025-03-06 RX ORDER — BENZONATATE 200 MG/1
200 CAPSULE ORAL 3 TIMES DAILY PRN
Qty: 30 CAPSULE | Refills: 0 | Status: SHIPPED | OUTPATIENT
Start: 2025-03-06

## 2025-03-06 RX ORDER — ORAL SEMAGLUTIDE 14 MG/1
1 TABLET ORAL DAILY
Qty: 30 TABLET | Refills: 0 | Status: SHIPPED | OUTPATIENT
Start: 2025-03-06

## 2025-03-06 NOTE — PROGRESS NOTES
CHIEF COMPLAINT:     Chief Complaint   Patient presents with    Cough     Started Sunday with cough, runny nose, wheezing at night       HPI:   Massiel Anders is a 75 year old female who presents for upper respiratory symptoms for  4 days. Patient reports congestion, mild dry cough.  Associated symptoms include chest congestion, sore throat starting d/t cough.  Symptoms have been progressing since onset. Now can feel the congestion in chest and back, having difficulty coughing up mucous, + wheezing at night. Denies fever, chills, sob.  Treating symptoms with nothing.     No hx of COVID; No COVID exposure  No flu shot this season.  No recent travel.  + large gatherings 2/24 birthday party MIL 89th      Other conditions:   BMI: Body mass index is 23.63 kg/m².  Heart Disease: no  DM: T2  Chronic Lung Disease: hx of bronchitis and pneumonia, last had pneumonia 12/2023.  Chronic Kidney Disease: no  Liver Disease: no  Immunocompromised: (Transplant, Heme-Onc, Chronic Steroid Use): no    Current Outpatient Medications   Medication Sig Dispense Refill    Semaglutide (RYBELSUS) 14 MG Oral Tab Take 1 tablet by mouth daily. 90 tablet 0    SITagliptin Phosphate 100 MG Oral Tab Take 1 tablet (100 mg total) by mouth daily. 90 tablet 0    rosuvastatin 10 MG Oral Tab Take 1 tablet (10 mg total) by mouth daily. 90 tablet 0    famotidine 40 MG Oral Tab Take 1 tablet (40 mg total) by mouth 2 (two) times daily. 180 tablet 1    FreeStyle Lancets Does not apply Misc Test  3times daily 100 each 2    Glucose Blood In Vitro Strip Test 3 times daily 100 each 2      Past Medical History:    Allergic rhinitis    Arthritis    Chronic cough    frequent several times a year    Diabetes (HCC)    Esophageal reflux    Heartburn    History of cardiac murmur    Leaking of urine    Obesity    Sleep apnea    Sleep disturbance      Past Surgical History:   Procedure Laterality Date    Colonoscopy  11years ago    Hysterectomy  1985    one ovary  removed, had fibroids and bleeding      ,     Other surgical history      Tonsillectomy      Total abdom hysterectomy  35 years ago         Social History     Socioeconomic History    Marital status:    Tobacco Use    Smoking status: Never    Smokeless tobacco: Never   Vaping Use    Vaping status: Never Used   Substance and Sexual Activity    Alcohol use: Yes     Alcohol/week: 2.0 standard drinks of alcohol     Types: 2 Glasses of wine per week    Drug use: No    Sexual activity: Yes     Partners: Male   Other Topics Concern    Caffeine Concern No    Stress Concern No    Weight Concern Yes    Special Diet No    Exercise Yes    Seat Belt Yes    Self-Exams No         REVIEW OF SYSTEMS:   GENERAL: feels well otherwise, normal appetite  SKIN: no rashes or abnormal skin lesions  HEENT: See HPI  LUNGS: denies shortness of breath or wheezing, See HPI  CARDIOVASCULAR: denies chest pain or palpitations   GI: denies N/V/C or abdominal pain  NEURO: denies dizziness or lightheadedness    EXAM:   /60   Pulse 89   Temp 98.8 °F (37.1 °C) (Oral)   Resp 16   Wt 121 lb (54.9 kg)   SpO2 98%   BMI 23.63 kg/m²     Physical Exam  Vitals reviewed.   Constitutional:       General: She is not in acute distress.     Appearance: Normal appearance. She is not ill-appearing.   HENT:      Head: Normocephalic and atraumatic.      Right Ear: Tympanic membrane and ear canal normal.      Left Ear: Tympanic membrane and ear canal normal.      Nose: Rhinorrhea present. No congestion. Rhinorrhea is clear.      Right Sinus: No maxillary sinus tenderness or frontal sinus tenderness.      Left Sinus: No maxillary sinus tenderness or frontal sinus tenderness.      Mouth/Throat:      Lips: Pink.      Mouth: Mucous membranes are moist.      Pharynx: Oropharynx is clear. Postnasal drip present. No posterior oropharyngeal erythema.      Comments: Uvula and tonsils absent  Eyes:      Extraocular Movements: Extraocular movements  intact.      Conjunctiva/sclera: Conjunctivae normal.   Cardiovascular:      Rate and Rhythm: Normal rate and regular rhythm.      Heart sounds: Murmur heard.   Pulmonary:      Effort: Pulmonary effort is normal.      Breath sounds: Rhonchi present. No decreased breath sounds, wheezing or rales.   Musculoskeletal:      Cervical back: Normal range of motion and neck supple.   Lymphadenopathy:      Cervical: No cervical adenopathy.   Skin:     General: Skin is warm and dry.      Findings: No rash.   Neurological:      General: No focal deficit present.      Mental Status: She is alert.   Psychiatric:         Speech: Speech normal.         Behavior: Behavior normal. Behavior is cooperative.          Recent Results (from the past 24 hours)   Rapid Covid-19    Collection Time: 03/06/25  9:51 AM    Specimen: Nares   Result Value Ref Range    Rapid SARS-CoV-2 by PCR Not Detected Not Detected    POCT Lot Number [id number]     POCT Expiration Date 8/2/26     POCT Procedure Control Control Valid Control Valid     ,149        ASSESSMENT AND PLAN:   Massiel Anders is a 75 year old female who presents with     ASSESSMENT:   Encounter Diagnosis   Name Primary?    Acute cough Yes     XR CHEST PA + LAT CHEST (CPT=71046)    Result Date: 3/6/2025  CONCLUSION:  Tortuous ectatic aorta.  Cardiac shadow is enlarged.  No acute disease seen.   LOCATION:  [facility]   Dictated by (CST): Moses Lamas MD on 3/06/2025 at 10:35 AM     Finalized by (CST): Moses Lamas MD on 3/06/2025 at 10:36 AM        PLAN:   Chest XR - WNL.  Informed.  Discussed likely viral etiology.   Reviewed symptom relief measures with patient.   Monitor for worsening symptoms.  Comfort care as described in Patient Instructions  Medications as below.      Meds & Refills for this Visit:  Requested Prescriptions     Signed Prescriptions Disp Refills    albuterol 108 (90 Base) MCG/ACT Inhalation Aero Soln 1 each 0     Sig: Inhale 2 puffs into the lungs  every 4 to 6 hours as needed for Wheezing.    predniSONE 20 MG Oral Tab 10 tablet 0     Sig: Take 2 tablets (40 mg total) by mouth daily for 5 days. Avoid taking at nighttime.    benzonatate 200 MG Oral Cap 30 capsule 0     Sig: Take 1 capsule (200 mg total) by mouth 3 (three) times daily as needed for cough.       Risks, benefits, and side effects of medication explained and discussed.    To f/u with PCP if sx do not resolve as anticipated.    Has appointment on 3/11/25.  The patient indicates understanding of these issues and agrees to the plan.        There are no Patient Instructions on file for this visit.

## 2025-03-06 NOTE — TELEPHONE ENCOUNTER
Pt would like refill of Semaglutide (RYBELSUS) 14 MG Oral Tab sent to   [id number] - [place], [place] - [facility] AT [facility] [address], [phone], [phone]   Pt stopped at  to ask for refill, she is out of medication. Thank you.

## 2025-03-06 NOTE — TELEPHONE ENCOUNTER
1. What are your symptoms?    Cough       2. How long have you been having these symptoms?    A week    3. Have you done anything already to treat your symptoms?     No     ADDITIONAL INFO:

## 2025-03-06 NOTE — TELEPHONE ENCOUNTER
LOV: 5/20/24    NOV: 3/11/25    Requesting refill:  Semaglutide (RYBELSUS) 14 MG     Last refill: 12/3/24 #90       Please see pended Rx for your approval. Thank you.

## 2025-03-06 NOTE — TELEPHONE ENCOUNTER
Patient's symptoms started last Sunday, cough with phlegm but hard to expectorate, runny nose, congestion, states that she can hear wheezing when she's laying down. She did not do a Covid test.   No difficulty breathing. No fever.    She did not take anything over-the-counter. Instructed patient to go to [facility] for evaluation.   Patient verbalized understanding and agreed with plan.

## 2025-03-11 ENCOUNTER — TELEPHONE (OUTPATIENT)
Dept: FAMILY MEDICINE CLINIC | Facility: CLINIC | Age: 75
End: 2025-03-11

## 2025-03-11 ENCOUNTER — OFFICE VISIT (OUTPATIENT)
Dept: FAMILY MEDICINE CLINIC | Facility: CLINIC | Age: 75
End: 2025-03-11
Payer: MEDICARE

## 2025-03-11 VITALS
DIASTOLIC BLOOD PRESSURE: 58 MMHG | BODY MASS INDEX: 25.13 KG/M2 | SYSTOLIC BLOOD PRESSURE: 109 MMHG | TEMPERATURE: 97 F | HEART RATE: 85 BPM | HEIGHT: 58.7 IN | WEIGHT: 123 LBS | OXYGEN SATURATION: 96 % | RESPIRATION RATE: 14 BRPM

## 2025-03-11 DIAGNOSIS — E55.9 VITAMIN D DEFICIENCY: ICD-10-CM

## 2025-03-11 DIAGNOSIS — K21.9 GASTROESOPHAGEAL REFLUX DISEASE WITHOUT ESOPHAGITIS: ICD-10-CM

## 2025-03-11 DIAGNOSIS — Z86.39 HISTORY OF DIABETIC KETOACIDOSIS: ICD-10-CM

## 2025-03-11 DIAGNOSIS — M85.88 OSTEOPENIA OF LUMBAR SPINE: ICD-10-CM

## 2025-03-11 DIAGNOSIS — Z12.31 SCREENING MAMMOGRAM FOR BREAST CANCER: ICD-10-CM

## 2025-03-11 DIAGNOSIS — I70.0 ATHEROSCLEROSIS OF AORTA: ICD-10-CM

## 2025-03-11 DIAGNOSIS — M85.859 OSTEOPENIA OF HIP, UNSPECIFIED LATERALITY: ICD-10-CM

## 2025-03-11 DIAGNOSIS — K21.9 GASTROESOPHAGEAL REFLUX DISEASE, UNSPECIFIED WHETHER ESOPHAGITIS PRESENT: ICD-10-CM

## 2025-03-11 DIAGNOSIS — R09.81 NASAL CONGESTION: ICD-10-CM

## 2025-03-11 DIAGNOSIS — Z00.00 ENCOUNTER FOR ANNUAL HEALTH EXAMINATION: Primary | ICD-10-CM

## 2025-03-11 DIAGNOSIS — I77.1 TORTUOUS AORTA: ICD-10-CM

## 2025-03-11 DIAGNOSIS — E78.2 HYPERLIPIDEMIA, MIXED: ICD-10-CM

## 2025-03-11 DIAGNOSIS — E78.00 TYPE 2 DIABETES MELLITUS WITH HYPERCHOLESTEROLEMIA (HCC): ICD-10-CM

## 2025-03-11 DIAGNOSIS — R05.1 ACUTE COUGH: ICD-10-CM

## 2025-03-11 DIAGNOSIS — E11.69 TYPE 2 DIABETES MELLITUS WITH HYPERCHOLESTEROLEMIA (HCC): ICD-10-CM

## 2025-03-11 DIAGNOSIS — E11.65 UNCONTROLLED TYPE 2 DIABETES MELLITUS WITH HYPERGLYCEMIA, WITHOUT LONG-TERM CURRENT USE OF INSULIN (HCC): ICD-10-CM

## 2025-03-11 DIAGNOSIS — M85.859 OSTEOPENIA OF NECK OF FEMUR, UNSPECIFIED LATERALITY: ICD-10-CM

## 2025-03-11 DIAGNOSIS — Z78.0 POSTMENOPAUSAL: ICD-10-CM

## 2025-03-11 DIAGNOSIS — R12 HEARTBURN: ICD-10-CM

## 2025-03-11 DIAGNOSIS — E11.59 TYPE 2 DIABETES MELLITUS WITH OTHER CIRCULATORY COMPLICATIONS (HCC): ICD-10-CM

## 2025-03-11 DIAGNOSIS — I28.8 DILATATION OF PULMONIC ARTERY (HCC): ICD-10-CM

## 2025-03-11 DIAGNOSIS — I65.22 ASYMPTOMATIC CAROTID ARTERY NARROWING WITHOUT INFARCTION, LEFT: ICD-10-CM

## 2025-03-11 RX ORDER — ROSUVASTATIN CALCIUM 10 MG/1
10 TABLET, COATED ORAL DAILY
Qty: 90 TABLET | Refills: 1 | Status: SHIPPED | OUTPATIENT
Start: 2025-03-11

## 2025-03-11 RX ORDER — FLUTICASONE PROPIONATE 50 MCG
2 SPRAY, SUSPENSION (ML) NASAL DAILY
Qty: 1 EACH | Refills: 0 | Status: SHIPPED | OUTPATIENT
Start: 2025-03-11 | End: 2026-03-06

## 2025-03-11 RX ORDER — FLUTICASONE PROPIONATE AND SALMETEROL 250; 50 UG/1; UG/1
1 POWDER RESPIRATORY (INHALATION) EVERY 12 HOURS SCHEDULED
Qty: 1 EACH | Refills: 0 | Status: SHIPPED | OUTPATIENT
Start: 2025-03-11

## 2025-03-11 RX ORDER — FAMOTIDINE 40 MG/1
40 TABLET, FILM COATED ORAL 2 TIMES DAILY
Qty: 180 TABLET | Refills: 1 | Status: SHIPPED | OUTPATIENT
Start: 2025-03-11

## 2025-03-11 RX ORDER — ORAL SEMAGLUTIDE 14 MG/1
1 TABLET ORAL DAILY
Qty: 90 TABLET | Refills: 1 | Status: SHIPPED | OUTPATIENT
Start: 2025-03-11

## 2025-03-11 NOTE — PATIENT INSTRUCTIONS
Start  inhaler 1 puff twice a day.  Use Flonase 2 sprays nostril once a day.  Monitor symptoms keep good hydration.  Continue current meds.   Watch diet for fats and carbs.   Stay active.    Return for fasting blood work.  Call [phone] to schedule Mammogram and ultrasound of the carotids after May 20, 2025.  Schedule medication check in June.  Further recommendation pending test results.      Refill policies:      Allow 3 business days for refills; controlled substances may take longer.  Contact your pharmacy at least 5-7 business days prior to running out of medication and have them send an electronic request or submit through the \"request refill\" option thru your [employer] account. No need to do both, as multiple requests will create an automated [employer] message to notify of a denial for one of the duplicated requests, causing you undue confusion.   Refills are NOT addressed on weekends; covering physicians do not authorize routine medications on weekends.  No narcotics or controlled substances are refilled after noon on Fridays or by on call physicians.  By law, narcotics cannot be faxed or phoned into your pharmacy.  If your prescription is due for a refill, you may be due for a follow up appointment. Please call our office at [phone] to make an appointment or schedule an appointment via [employer].  To best provide you care, patients receiving routine medications need to be seen at least twice a year. Patients receiving narcotic/controlled substance medications need to be seen at least once every 3 months.  In the event that your preferred pharmacy does not have the requested medication in stock (ie Backordered), it is your responsibility to find another pharmacy that has the requested medication available. We will gladly send a new prescription to that pharmacy at your request.  controlled substances may not be able to be filled out of state due to license restrictions.  If you have a planned trip, it's  best to call your pharmacy at least 5-7 business days to prevent any delays in your medication refill.    Scheduling Tests:    If your physician has ordered radiology tests such as MRI or CT scans, please contact Central Scheduling at [phone] right away to schedule the test.  Once scheduled, the [facility] Centralized Referral Team will work with your insurance carrier to obtain pre-certification or prior authorization.  Depending on your insurance carrier, approval may take 3-10 days.  It is highly recommended patients assure they have received an authorization before having a test performed.  If test is done without insurance authorization, patient may be responsible for the entire amount billed.      Precertification and Prior Authorizations:  If your physician has recommended that you have a procedure or additional testing performed the [facility] Centralized Referral Team will contact your insurance carrier to obtain pre-certification or prior authorization.    You are strongly encouraged to contact your insurance carrier to verify that your procedure/test has been approved and is a COVERED benefit.  Although the [facility] Centralized Referral Team does its due diligence, the insurance carrier gives the disclaimer that \"Although the procedure is authorized, this does not guarantee payment.\"    Ultimately the patient is responsible for payment.   Thank you for your understanding in this matter.  Paperwork Completion:  If you require FMLA or disability paperwork for your recovery, please make sure to either drop it off or have it faxed to our office at [phone]. Be sure the form has your name and date of birth on it.  The form will be faxed to our Forms Department and they will complete it for you.  There is a 25$ fee for all forms that need to be filled out.  Please be aware there is a 10-14 day turnaround time.  You will need to sign a release of information (RAEANN) form if your paperwork does not come with one.  You may  call the Forms Department with any questions at [phone].  Their fax number is [phone].

## 2025-03-11 NOTE — PROGRESS NOTES
Subjective:   Massiel Anders is a 75 year old female who presents for a Medicare Subsequent Annual Wellness visit (Pt already had Initial Annual Wellness).  Patient is also coming here for follow-up on her diabetes, hypertension, hyperlipidemia , vitamin D deficiency, osteopenia,left carotid atherosclerosis, postmenopausal, also for evaluation of cough and runny nose.    Patient started to have runny nose and cough over a week ago.  Nasal congestion.  Cough is mostly dry.  Tried some saline spray which helped.  No fevers no chills.  Patient went to urgent care.  Chest x-ray came back normal patient was giving albuterol inhaler prednisone and benzonatate.  Still having some cough.  Will try  different inhaler.    Diabetes patient is taking her medications regularly.  She is only taking Rybelsus right now.  She tolerates medication well.  She stopped other meds.  Patient normal blood work done last hemoglobin A1c was high.  Blood sugars were in the 120s.  She is trying to eat better but she has a sweet tooth.  She is due for eye examination.  .  Denies any numbness or tingling of the feet.      Heartburn using famotidine stable continue present management.    Hypertension blood pressure stable,  continue current medications monitor    Hyperlipidemia patient is taking medication we will check the blood work further recommendation pending test results,  she needs refill of the medication    Vitamin D deficiency continue supplementation monitor blood work    Postmenopausal/osteopenia check bone density scan further recommendation pending results.  Stay on calcium vitamin D supplementation.    Left carotid atherosclerosis we will have patient to check ultrasound of the carotids , patient was set up an appointment.     Colon cancer screening  colonoscopy is up-to-date.  Colonoscopy due 2029.    Dilatation of the pulmonary artery on previous testing.  Will do echo of the heart whenever patient is feeling  better.          History/Other:   Fall Risk Assessment:   She has been screened for Falls and is low risk.      Cognitive Assessment:   She had a completely normal cognitive assessment - see flowsheet entries     Functional Ability/Status:   Massiel Anders has some abnormal functions as listed below:  She has Vision problems based on screening of functional status. She has problems with Memory based on screening of functional status.           Depression Screening (PHQ-2/PHQ-9): PHQ-2 SCORE: 0  , done 3/11/2025        Advanced Directives:   She does NOT have a Living Will. [Do you have a living will?: No]  She does NOT have a Power of  for Health Care. [Do you have a healthcare power of ?: No]  Discussed Advance Care Planning with patient (and family/surrogate if present). Standard forms made available to patient in After Visit Summary.      Patient Active Problem List   Diagnosis    New onset type 2 diabetes mellitus (HCC)    Elevated liver function tests    Snoring    Type 2 diabetes mellitus without complication, without long-term current use of insulin (HCC)    Osteopenia    Asymptomatic carotid artery narrowing without infarction, left    Hyperlipidemia, mixed    Vitamin D deficiency    Nasal congestion    Type 2 diabetes mellitus with other circulatory complications (HCC)    Colon cancer screening    Gastroesophageal reflux disease    GERD (gastroesophageal reflux disease)    Heart burn    Personal history of colonic polyps    Abnormal CT of the chest    Dilatation of pulmonic artery (HCC)     Allergies:  She is allergic to metformin.    Current Medications:  Outpatient Medications Marked as Taking for the 3/11/25 encounter (Office Visit) with Salima Vega MD   Medication Sig    famotidine 40 MG Oral Tab Take 1 tablet (40 mg total) by mouth 2 (two) times daily.    rosuvastatin 10 MG Oral Tab Take 1 tablet (10 mg total) by mouth daily.    Semaglutide (RYBELSUS) 14 MG Oral Tab Take 1  tablet by mouth daily.    SITagliptin Phosphate 100 MG Oral Tab Take 1 tablet (100 mg total) by mouth daily.    fluticasone-salmeterol (WIXELA INHUB) 250-50 MCG/ACT Inhalation Aerosol Powder, Breath Activated Inhale 1 puff into the lungs every 12 (twelve) hours.    fluticasone propionate 50 MCG/ACT Nasal Suspension 2 sprays by Each Nare route daily.    albuterol 108 (90 Base) MCG/ACT Inhalation Aero Soln Inhale 2 puffs into the lungs every 4 to 6 hours as needed for Wheezing.    benzonatate 200 MG Oral Cap Take 1 capsule (200 mg total) by mouth 3 (three) times daily as needed for cough.    FreeStyle Lancets Does not apply Misc Test  3times daily    Glucose Blood In Vitro Strip Test 3 times daily       Medical History:  She  has a past medical history of Allergic rhinitis (unknown), Arthritis (6 months), Chronic cough (since child (6yr old)), Diabetes (HCC), Esophageal reflux (6 months), Heartburn, History of cardiac murmur, Leaking of urine, Obesity, Sleep apnea, and Sleep disturbance.  Surgical History:  She  has a past surgical history that includes tonsillectomy; other surgical history; hysterectomy (); colonoscopy (11years ago); total abdom hysterectomy (35 years ago); and  (, ).   Family History:  Her family history includes Breast Cancer in her maternal aunt; Diabetes in her daughter, father, mother, and sister; Hypertension in her mother; Other in her father; Prostate Cancer in her father; Stroke in her mother.  Social History:  She  reports that she has never smoked. She has never used smokeless tobacco. She reports current alcohol use of about 2.0 standard drinks of alcohol per week. She reports that she does not use drugs.    Tobacco:  She has never smoked tobacco.    CAGE Alcohol Screen:   CAGE screening score of 0 on 3/11/2025, showing low risk of alcohol abuse.      Patient Care Team:  Salima Vega MD as PCP - General (Family Medicine)  Raimundo Rodriguez MD  (GASTROENTEROLOGY)  Caro Pruitt DO as Consulting Physician (NEUROLOGY)    Review of Systems  GENERAL: feels well otherwise  SKIN: denies any unusual skin lesions  EYES: denies blurred vision or double vision  HEENT: nasal congestion, sinus congestion, sore throat now resolved,  LUNGS: denies shortness of breath with exertion, having some cough chest congestion  CARDIOVASCULAR: denies chest pain on exertion  GI: denies abdominal pain, denies heartburn  : denies dysuria, vaginal discharge or itching, no complaint of urinary incontinence   MUSCULOSKELETAL: denies back pain  NEURO: denies headaches  PSYCHE: denies depression or anxiety  HEMATOLOGIC: denies hx of anemia  ENDOCRINE: denies thyroid history  ALL/ASTHMA: denies hx of allergy or asthma    Objective:   Physical Exam  General Appearance:  Alert, cooperative, no distress, appears stated age, having some dry cough   Head:  Normocephalic, without obvious abnormality, atraumatic   Eyes:  PERRL, conjunctiva/corneas clear, EOM's intact both eyes   Ears:  Normal TM's and external ear canals, both ears   Nose: Nares normal, septum midline,mucosa normal, no  sinus tenderness, irritation of the nasal mucosa with some postnasal drip   Throat: Lips, mucosa, and tongue normal; teeth and gums normal   Neck: Supple, symmetrical, trachea midline, no adenopathy;  thyroid: not enlarged, symmetric, no tenderness/mass/nodules; no carotid bruit   Back:   Symmetric, no curvature, ROM normal, no CVA tenderness   Lungs:   Clear to auscultation bilaterally, there is some few rhonchi's: Soft nodule of the cough   Heart:  Regular rate and rhythm, S1 and S2 normal, no murmur,    Abdomen:   Soft, non-tender, bowel sounds active all four quadrants,  no masses, no organomegaly   Pelvic: Deferred  Breast exam -  no palpable masses no supraclavicular no axilla lymphadenopathy   Extremities: Extremities normal, atraumatic, no cyanosis or edema   Pulses: 2+ and symmetric   Skin: Skin  color, texture, turgor normal, no rashes or lesions   Lymph nodes: Cervical, supraclavicular, and axillary nodes normal   Neurologic: Normal   Bilateral barefoot skin diabetic exam is normal, except some dryness of the feet also there is some osteoarthritis changes of the toes.    Bilateral monofilament/sensation of both feet is normal.  Pulsation pedal pulse exam of both lower legs/feet is normal as well.    /58 (BP Location: Left arm, Patient Position: Sitting, Cuff Size: adult)   Pulse 85   Temp 97 °F (36.1 °C) (Temporal)   Resp 14   Ht 4' 10.7\" (1.491 m)   Wt 123 lb (55.8 kg)   SpO2 96%   BMI 25.10 kg/m²  Estimated body mass index is 25.1 kg/m² as calculated from the following:    Height as of this encounter: 4' 10.7\" (1.491 m).    Weight as of this encounter: 123 lb (55.8 kg).    Medicare Hearing Assessment:   Hearing Screening    Time taken: 3/11/2025 10:18 AM  Screening Method: Finger Rub  Finger Rub Result: Pass       Results for orders placed or performed in visit on 03/06/25   Rapid Covid-19    Collection Time: 03/06/25  9:51 AM    Specimen: Nares   Result Value Ref Range    Rapid SARS-CoV-2 by PCR Not Detected Not Detected    POCT Lot Number [id number]     POCT Expiration Date 8/2/26     POCT Procedure Control Control Valid Control Valid     ,149    PROCEDURE:  XR CHEST PA + LAT CHEST (CPT=71046)     INDICATIONS:  R05.1 Acute cough     COMPARISON:  GINNY ALVAREZ, XR CHEST PA + LAT CHEST (CPT=71046), 11/24/2023, 10:34 AM.     TECHNIQUE:  PA and lateral chest radiographs were obtained.     PATIENT STATED HISTORY: (As transcribed by Technologist)  Patient states cough, wheezing and difficulty breathing for 5 days.         FINDINGS:  Tortuous ectatic aorta present.  Cardiac shadow is enlarged..  The lungs are clear of active--appearing disease process.  The costophrenic angles are sharp.  There is no active disease seen. There are degenerative changes present in the spine.                   Impression  CONCLUSION:  Tortuous ectatic aorta.  Cardiac shadow is enlarged.  No acute disease seen.        LOCATION:  [id number]        Dictated by (CST): Moses Lamas MD on 3/06/2025 at 10:35 AM      Finalized by (CST): Moses Lamas MD on 3/06/2025 at 10:36 AM      Assessment & Plan:   Massiel Anders is a 75 year old female who presents for a Medicare Assessment.     1. Encounter for annual health examination (Primary)  2. Uncontrolled type 2 diabetes mellitus with hyperglycemia, without long-term current use of insulin (HCC)  -     Hemoglobin A1C; Future; Expected date: 03/11/2025  -     Microalb/Creat Ratio, Random Urine; Future; Expected date: 03/11/2025  -     Rybelsus; Take 1 tablet by mouth daily.  Dispense: 90 tablet; Refill: 1  -     SITagliptin Phosphate; Take 1 tablet (100 mg total) by mouth daily.  Dispense: 90 tablet; Refill: 1  3. Hyperlipidemia, mixed  -     Comp Metabolic Panel (14); Future; Expected date: 03/11/2025  -     Lipid Panel; Future; Expected date: 03/11/2025  -     Urinalysis with Culture Reflex; Future; Expected date: 03/11/2025  -     TSH W Reflex To Free T4; Future; Expected date: 03/11/2025  -     Rosuvastatin Calcium; Take 1 tablet (10 mg total) by mouth daily.  Dispense: 90 tablet; Refill: 1  4. History of diabetic ketoacidosis  -     CBC With Differential With Platelet; Future; Expected date: 03/11/2025  5. Screening mammogram for breast cancer  -     [facility] SRIDHAR 2D+3D SCREENING BILAT (CPT=77067/25778); Future; Expected date: 03/11/2025  6. Heartburn  -     Famotidine; Take 1 tablet (40 mg total) by mouth 2 (two) times daily.  Dispense: 180 tablet; Refill: 1  7. Gastroesophageal reflux disease, unspecified whether esophagitis present  8. Asymptomatic carotid artery narrowing without infarction, left  -     US CAROTID DOPPLER BILAT - DIAG IMG (CPT=93880); Future; Expected date: 03/11/2025  -     Cancel: CARD ECHO 2D DOPPLER (CPT=93306); Future; Expected date:  03/11/2025  9. Postmenopausal  10. Type 2 diabetes mellitus with hypercholesterolemia (HCC)  11. Osteopenia of hip, unspecified laterality  12. Osteopenia of lumbar spine  13. Osteopenia of neck of femur, unspecified laterality  14. Vitamin D deficiency  15. Type 2 diabetes mellitus with other circulatory complications (HCC)  16. Gastroesophageal reflux disease without esophagitis  17. Nasal congestion  -     Fluticasone Propionate; 2 sprays by Each Nare route daily.  Dispense: 1 each; Refill: 0  18. Acute cough  -     Fluticasone-Salmeterol; Inhale 1 puff into the lungs every 12 (twelve) hours.  Dispense: 1 each; Refill: 0  19. Dilatation of pulmonic artery (HCC)  -     Cancel: CARD ECHO 2D DOPPLER (CPT=93306); Future; Expected date: 03/11/2025  -     CARD ECHO 2D DOPPLER (CPT=93306); Future; Expected date: 03/11/2025  20. Tortuous aorta  -     Cancel: CARD ECHO 2D DOPPLER (CPT=93306); Future; Expected date: 03/11/2025  -     CARD ECHO 2D DOPPLER (CPT=93306); Future; Expected date: 03/11/2025  21. Atherosclerosis of aorta  -     CARD ECHO 2D DOPPLER (CPT=93306); Future; Expected date: 03/11/2025     Diabetes type 2 uncontrolled with hypercholesterolemia, continue monitoring blood work patient will return for testing continue current medications refills were given today.  Patient is due for eye examination denies any numbness or tingling of the feet. Further recommendation pending test results.    Hyperlipidemia on medication continue medication monitor blood work.    Heartburn/ GERD on famotidine and continue present management.    Breast cancer screening mammogram ordered breast examination normal today.    Osteopenia  monitoring wellness discussed on calcium vitamin D supplementation.    Carotid stenosis check ultrasound monitor.    Vitamin D deficiency continue supplementation monitor blood work.    Postmenopausal  monitor DEXA scan.    Acute cough close start Advair discus 1 puff twice a day. Use nasal spray.   Monitor.    Nasal congestion with nasal spray Flonase continue saline spray as needed.    Dilatation of the pulmonary arteries echo ordered patient will have it done after    Tortuous aorta, aortic atherosclerosis check echo of the heart for evaluation.    Depression screening reviewed family is supportive.  Time spent 8 minutes.  She was doing a lot of traveling recently with her  and her son.      Start  inhaler 1 puff twice a day.  Use Flonase 2 sprays nostril once a day.  Monitor symptoms keep good hydration.  Continue current meds.   Watch diet for fats and carbs.   Stay active.    Return for fasting blood work.  Call [phone] to schedule Mammogram and ultrasound of the carotids after May 20, 2025.  Schedule medication check in June.  Further recommendation pending test results.    Call [phone] to schedule echo of the heart monitor schedule symptoms resolve.    The patient indicates understanding of these issues and agrees to the plan.  Further testing ordered.  Imaging studies ordered.  Lab work ordered.  Reinforced healthy diet, lifestyle, and exercise.      Return in about 3 months (around 6/11/2025).     Salima Vega MD, 3/9/2022     Supplementary Documentation:   General Health:  In the past six months, have you lost more than 10 pounds without trying?: 2 - No  Has your appetite been poor?: No  Type of Diet: Balanced, Low Salt  How does the patient maintain a good energy level?: Daily Walks  How would you describe your daily physical activity?: Light  How would you describe your current health state?: Good  How do you maintain positive mental well-being?: Puzzles, Visiting Family  On a scale of 0 to 10, with 0 being no pain and 10 being severe pain, what is your pain level?: 0 - (None)  In the past six months, have you experienced urine leakage?: 1-Yes (with coughing)  At any time do you feel concerned for the safety/well-being of yourself and/or your children, in your home or  elsewhere?: No  Have you had any immunizations at another office such as Influenza, Hepatitis B, Tetanus, or Pneumococcal?: No       Massiel Anders's SCREENING SCHEDULE   Tests on this list are recommended by your physician but may not be covered, or covered at this frequency, by your insurer.   Please check with your insurance carrier before scheduling to verify coverage.   PREVENTATIVE SERVICES FREQUENCY &  COVERAGE DETAILS LAST COMPLETION DATE   Diabetes Screening    Fasting Blood Sugar /  Glucose    One screening every 12 months if never tested or if previously tested but not diagnosed with pre-diabetes   One screening every 6 months if diagnosed with pre-diabetes Lab Results   Component Value Date     (H) 05/20/2024        Cardiovascular Disease Screening    Lipid Panel  Cholesterol  Lipoprotein (HDL)  Triglycerides Covered every 5 years for all Medicare beneficiaries without apparent signs or symptoms of cardiovascular disease Lab Results   Component Value Date    CHOLEST 149 05/20/2024    HDL 60 (H) 05/20/2024    LDL 62 05/20/2024    TRIG 159 (H) 05/20/2024         Electrocardiogram (EKG)   Covered if needed at Welcome to Medicare, and non-screening if indicated for medical reasons 01/20/2016      Ultrasound Screening for Abdominal Aortic Aneurysm (AAA) Covered once in a lifetime for one of the following risk factors    Men who are 65-75 years old and have ever smoked    Anyone with a family history -     Colorectal Cancer Screening  Covered for ages 50-85; only need ONE of the following:    Colonoscopy   Covered every 10 years    Covered every 2 years if patient is at high risk or previous colonoscopy was abnormal 07/22/2022    Health Maintenance   Topic Date Due    Colorectal Cancer Screening  Discontinued       Flexible Sigmoidoscopy   Covered every 4 years -    Fecal Occult Blood Test Covered annually -   Bone Density Screening    Bone density screening    Covered every 2 years after age 65  if diagnosed with risk of osteoporosis or estrogen deficiency.    Covered yearly for long-term glucocorticoid medication use (Steroids) Last Dexa Scan:    XR DEXA BONE DENSITOMETRY (CPT=77080) 05/20/2024      No recommendations at this time   Pap and Pelvic    Pap   Covered every 2 years for women at normal risk; Annually if at high risk -  No recommendations at this time    Chlamydia Annually if high risk -  No recommendations at this time   Screening Mammogram    Mammogram     Recommend annually for all female patients aged 40 and older    One baseline mammogram covered for patients aged 35-39 05/20/2024    Health Maintenance   Topic Date Due    Mammogram  Discontinued       Immunizations    Influenza Covered once per flu season  Please get every year -  Influenza Vaccine(1) due on 10/01/2024    Pneumococcal Each vaccine ([id number] & [id number]) covered once after 65 Prevnar 13: 01/18/2016    [id number]: 02/13/2017     No recommendations at this time    Hepatitis B One screening covered for patients with certain risk factors   07/25/2022  No recommendations at this time    Tetanus Toxoid Not covered by Medicare Part B unless medically necessary (cut with metal); may be covered with your pharmacy prescription benefits -    Tetanus, Diptheria and Pertusis TD and TDaP Not covered by Medicare Part B -  No recommendations at this time    Zoster Not covered by Medicare Part B; may be covered with your pharmacy  prescription benefits 01/05/2023  Zoster Vaccines(2 of 3) due on 03/02/2023     Diabetes      Hemoglobin A1C Annually; if last result is elevated, may be asked to retest more frequently.    Medicare covers every 3 months Lab Results   Component Value Date     (H) 05/20/2024    A1C 7.6 (H) 05/20/2024       Hemoglobin A1C Test due on 11/20/2024    Creat/alb ratio Annually Lab Results   Component Value Date    MICROALBCREA 7.2 05/20/2024       LDL Annually Lab Results   Component Value Date    LDL 62  05/20/2024       Dilated Eye Exam Annually Last Diabetic Eye Exam:  Last Dilated Eye Exam: 03/04/25  Eye Exam shows Diabetic Retinopathy?: No

## 2025-03-11 NOTE — TELEPHONE ENCOUNTER
Please give patient a call.  I would like her to do echo of the heart whenever she is feeling better and the cough resolves.  She had some atherosclerosis of the aorta with some tortuous aorta on the x-ray I would like to have echo checked.  Order was already placed.  Thank you

## 2025-03-12 NOTE — TELEPHONE ENCOUNTER
Patient informed of Dr. Vega's note below. Central scheduling number provided. Patient verbalized understanding and agreed with plan.

## 2025-05-12 ENCOUNTER — LAB ENCOUNTER (OUTPATIENT)
Dept: LAB | Age: 75
End: 2025-05-12
Attending: FAMILY MEDICINE
Payer: MEDICARE

## 2025-05-12 ENCOUNTER — HOSPITAL ENCOUNTER (OUTPATIENT)
Dept: CV DIAGNOSTICS | Facility: HOSPITAL | Age: 75
Discharge: HOME | End: 2025-05-12
Attending: FAMILY MEDICINE
Payer: MEDICARE

## 2025-05-12 DIAGNOSIS — R06.83 SNORING: ICD-10-CM

## 2025-05-12 DIAGNOSIS — E11.65 UNCONTROLLED TYPE 2 DIABETES MELLITUS WITH HYPERGLYCEMIA, WITHOUT LONG-TERM CURRENT USE OF INSULIN (HCC): ICD-10-CM

## 2025-05-12 DIAGNOSIS — Z86.39 HISTORY OF DIABETIC KETOACIDOSIS: ICD-10-CM

## 2025-05-12 DIAGNOSIS — E78.2 HYPERLIPIDEMIA, MIXED: ICD-10-CM

## 2025-05-12 DIAGNOSIS — I77.1 TORTUOUS AORTA: ICD-10-CM

## 2025-05-12 DIAGNOSIS — I28.8 DILATATION OF PULMONIC ARTERY (HCC): ICD-10-CM

## 2025-05-12 DIAGNOSIS — I70.0 ATHEROSCLEROSIS OF AORTA: ICD-10-CM

## 2025-05-12 LAB
ALBUMIN SERPL-MCNC: 4.4 G/DL (ref 3.2–4.8)
ALBUMIN/GLOB SERPL: 1.8 {RATIO} (ref 1–2)
ALP LIVER SERPL-CCNC: 73 U/L (ref 55–142)
ALT SERPL-CCNC: 21 U/L (ref 10–49)
ANION GAP SERPL CALC-SCNC: 0 MMOL/L (ref 0–18)
AST SERPL-CCNC: 20 U/L (ref ?–34)
BASOPHILS # BLD AUTO: 0.09 X10(3) UL (ref 0–0.2)
BASOPHILS NFR BLD AUTO: 1.5 %
BILIRUB SERPL-MCNC: 0.6 MG/DL (ref 0.2–1.1)
BILIRUB UR QL STRIP.AUTO: NEGATIVE
BUN BLD-MCNC: 14 MG/DL (ref 9–23)
CALCIUM BLD-MCNC: 9.8 MG/DL (ref 8.7–10.6)
CHLORIDE SERPL-SCNC: 108 MMOL/L (ref 98–112)
CHOLEST SERPL-MCNC: 144 MG/DL (ref ?–200)
CLARITY UR REFRACT.AUTO: CLEAR
CO2 SERPL-SCNC: 29 MMOL/L (ref 21–32)
CREAT BLD-MCNC: 0.68 MG/DL (ref 0.55–1.02)
CREAT UR-SCNC: 83 MG/DL
EGFRCR SERPLBLD CKD-EPI 2021: 90 ML/MIN/1.73M2 (ref 60–?)
EOSINOPHIL # BLD AUTO: 0.24 X10(3) UL (ref 0–0.7)
EOSINOPHIL NFR BLD AUTO: 4.1 %
ERYTHROCYTE [DISTWIDTH] IN BLOOD BY AUTOMATED COUNT: 14.6 %
EST. AVERAGE GLUCOSE BLD GHB EST-MCNC: 163 MG/DL (ref 68–126)
FASTING PATIENT LIPID ANSWER: YES
FASTING STATUS PATIENT QL REPORTED: YES
GLOBULIN PLAS-MCNC: 2.5 G/DL (ref 2–3.5)
GLUCOSE BLD-MCNC: 137 MG/DL (ref 70–99)
GLUCOSE UR STRIP.AUTO-MCNC: NORMAL MG/DL
HBA1C MFR BLD: 7.3 % (ref ?–5.7)
HCT VFR BLD AUTO: 41.1 % (ref 35–48)
HDLC SERPL-MCNC: 68 MG/DL (ref 40–59)
HGB BLD-MCNC: 13.3 G/DL (ref 12–16)
IMM GRANULOCYTES # BLD AUTO: 0.01 X10(3) UL (ref 0–1)
IMM GRANULOCYTES NFR BLD: 0.2 %
KETONES UR STRIP.AUTO-MCNC: NEGATIVE MG/DL
LDLC SERPL CALC-MCNC: 61 MG/DL (ref ?–100)
LEUKOCYTE ESTERASE UR QL STRIP.AUTO: 75
LYMPHOCYTES # BLD AUTO: 1.84 X10(3) UL (ref 1–4)
LYMPHOCYTES NFR BLD AUTO: 31.5 %
MCH RBC QN AUTO: 29.4 PG (ref 26–34)
MCHC RBC AUTO-ENTMCNC: 32.4 G/DL (ref 31–37)
MCV RBC AUTO: 90.9 FL (ref 80–100)
MICROALBUMIN UR-MCNC: 0.3 MG/DL
MICROALBUMIN/CREAT 24H UR-RTO: 3.6 UG/MG (ref ?–30)
MONOCYTES # BLD AUTO: 0.48 X10(3) UL (ref 0.1–1)
MONOCYTES NFR BLD AUTO: 8.2 %
NEUTROPHILS # BLD AUTO: 3.18 X10 (3) UL (ref 1.5–7.7)
NEUTROPHILS # BLD AUTO: 3.18 X10(3) UL (ref 1.5–7.7)
NEUTROPHILS NFR BLD AUTO: 54.5 %
NONHDLC SERPL-MCNC: 76 MG/DL (ref ?–130)
OSMOLALITY SERPL CALC.SUM OF ELEC: 287 MOSM/KG (ref 275–295)
PH UR STRIP.AUTO: 5 [PH] (ref 5–8)
PLATELET # BLD AUTO: 303 10(3)UL (ref 150–450)
POTASSIUM SERPL-SCNC: 4.4 MMOL/L (ref 3.5–5.1)
PROT SERPL-MCNC: 6.9 G/DL (ref 5.7–8.2)
PROT UR STRIP.AUTO-MCNC: NEGATIVE MG/DL
RBC # BLD AUTO: 4.52 X10(6)UL (ref 3.8–5.3)
RBC UR QL AUTO: NEGATIVE
SODIUM SERPL-SCNC: 137 MMOL/L (ref 136–145)
SP GR UR STRIP.AUTO: 1.02 (ref 1–1.03)
TRIGL SERPL-MCNC: 75 MG/DL (ref 30–149)
TSI SER-ACNC: 1.94 UIU/ML (ref 0.55–4.78)
UROBILINOGEN UR STRIP.AUTO-MCNC: NORMAL MG/DL
VIT B12 SERPL-MCNC: 478 PG/ML (ref 211–911)
VLDLC SERPL CALC-MCNC: 11 MG/DL (ref 0–30)
WBC # BLD AUTO: 5.8 X10(3) UL (ref 4–11)

## 2025-05-12 PROCEDURE — 83036 HEMOGLOBIN GLYCOSYLATED A1C: CPT

## 2025-05-12 PROCEDURE — 87077 CULTURE AEROBIC IDENTIFY: CPT

## 2025-05-12 PROCEDURE — 36415 COLL VENOUS BLD VENIPUNCTURE: CPT

## 2025-05-12 PROCEDURE — 80061 LIPID PANEL: CPT

## 2025-05-12 PROCEDURE — 81001 URINALYSIS AUTO W/SCOPE: CPT

## 2025-05-12 PROCEDURE — 87086 URINE CULTURE/COLONY COUNT: CPT

## 2025-05-12 PROCEDURE — 84443 ASSAY THYROID STIM HORMONE: CPT

## 2025-05-12 PROCEDURE — 85025 COMPLETE CBC W/AUTO DIFF WBC: CPT

## 2025-05-12 PROCEDURE — 80053 COMPREHEN METABOLIC PANEL: CPT

## 2025-05-12 PROCEDURE — 82607 VITAMIN B-12: CPT

## 2025-05-12 PROCEDURE — 87186 SC STD MICRODIL/AGAR DIL: CPT

## 2025-05-12 PROCEDURE — 82570 ASSAY OF URINE CREATININE: CPT

## 2025-05-12 PROCEDURE — 93306 TTE W/DOPPLER COMPLETE: CPT | Performed by: FAMILY MEDICINE

## 2025-05-12 PROCEDURE — 82043 UR ALBUMIN QUANTITATIVE: CPT

## 2025-05-13 ENCOUNTER — HOSPITAL ENCOUNTER (OUTPATIENT)
Dept: ULTRASOUND IMAGING | Facility: HOSPITAL | Age: 75
Discharge: HOME | End: 2025-05-13
Attending: FAMILY MEDICINE
Payer: MEDICARE

## 2025-05-13 DIAGNOSIS — I65.22 ASYMPTOMATIC CAROTID ARTERY NARROWING WITHOUT INFARCTION, LEFT: ICD-10-CM

## 2025-05-13 PROCEDURE — 93880 EXTRACRANIAL BILAT STUDY: CPT | Performed by: FAMILY MEDICINE

## 2025-05-13 RX ORDER — CEPHALEXIN 500 MG/1
500 CAPSULE ORAL 3 TIMES DAILY
Qty: 15 CAPSULE | Refills: 0 | Status: SHIPPED | OUTPATIENT
Start: 2025-05-13 | End: 2025-05-18

## 2025-05-21 ENCOUNTER — OFFICE VISIT (OUTPATIENT)
Dept: FAMILY MEDICINE CLINIC | Facility: CLINIC | Age: 75
End: 2025-05-21
Payer: MEDICARE

## 2025-05-21 ENCOUNTER — HOSPITAL ENCOUNTER (OUTPATIENT)
Dept: MAMMOGRAPHY | Facility: HOSPITAL | Age: 75
Discharge: HOME | End: 2025-05-21
Attending: FAMILY MEDICINE
Payer: MEDICARE

## 2025-05-21 VITALS
DIASTOLIC BLOOD PRESSURE: 75 MMHG | SYSTOLIC BLOOD PRESSURE: 138 MMHG | RESPIRATION RATE: 12 BRPM | TEMPERATURE: 97 F | HEART RATE: 70 BPM | HEIGHT: 58.7 IN | WEIGHT: 113 LBS | BODY MASS INDEX: 23.09 KG/M2

## 2025-05-21 DIAGNOSIS — I65.22 LEFT CAROTID STENOSIS: ICD-10-CM

## 2025-05-21 DIAGNOSIS — E78.00 TYPE 2 DIABETES MELLITUS WITH HYPERCHOLESTEROLEMIA (HCC): ICD-10-CM

## 2025-05-21 DIAGNOSIS — E11.65 UNCONTROLLED TYPE 2 DIABETES MELLITUS WITH HYPERGLYCEMIA, WITHOUT LONG-TERM CURRENT USE OF INSULIN (HCC): Primary | ICD-10-CM

## 2025-05-21 DIAGNOSIS — R12 HEARTBURN: ICD-10-CM

## 2025-05-21 DIAGNOSIS — N30.00 ACUTE CYSTITIS WITHOUT HEMATURIA: ICD-10-CM

## 2025-05-21 DIAGNOSIS — E78.2 HYPERLIPIDEMIA, MIXED: ICD-10-CM

## 2025-05-21 DIAGNOSIS — E11.69 TYPE 2 DIABETES MELLITUS WITH HYPERCHOLESTEROLEMIA (HCC): ICD-10-CM

## 2025-05-21 DIAGNOSIS — K21.9 GASTROESOPHAGEAL REFLUX DISEASE WITHOUT ESOPHAGITIS: ICD-10-CM

## 2025-05-21 DIAGNOSIS — Z12.31 SCREENING MAMMOGRAM FOR BREAST CANCER: ICD-10-CM

## 2025-05-21 PROCEDURE — 77067 SCR MAMMO BI INCL CAD: CPT | Performed by: FAMILY MEDICINE

## 2025-05-21 PROCEDURE — 99214 OFFICE O/P EST MOD 30 MIN: CPT | Performed by: FAMILY MEDICINE

## 2025-05-21 PROCEDURE — 77063 BREAST TOMOSYNTHESIS BI: CPT | Performed by: FAMILY MEDICINE

## 2025-05-21 PROCEDURE — G2211 COMPLEX E/M VISIT ADD ON: HCPCS | Performed by: FAMILY MEDICINE

## 2025-05-21 RX ORDER — FAMOTIDINE 40 MG/1
40 TABLET, FILM COATED ORAL 2 TIMES DAILY
Qty: 180 TABLET | Refills: 1 | Status: SHIPPED | OUTPATIENT
Start: 2025-05-21

## 2025-05-21 RX ORDER — GLIMEPIRIDE 2 MG/1
2 TABLET ORAL
Qty: 30 TABLET | Refills: 0 | Status: CANCELLED | OUTPATIENT
Start: 2025-05-21

## 2025-05-21 RX ORDER — ORAL SEMAGLUTIDE 14 MG/1
1 TABLET ORAL DAILY
Qty: 90 TABLET | Refills: 1 | Status: SHIPPED | OUTPATIENT
Start: 2025-05-21

## 2025-05-21 RX ORDER — ROSUVASTATIN CALCIUM 10 MG/1
10 TABLET, COATED ORAL DAILY
Qty: 90 TABLET | Refills: 1 | Status: SHIPPED | OUTPATIENT
Start: 2025-05-21

## 2025-05-22 NOTE — PATIENT INSTRUCTIONS
Continue current meds.   Watch diet for fats and carbs.   Stay active.      VISIT SUMMARY:  Today, we reviewed your recent antibiotic treatment for a urinary tract infection and your ongoing diabetes management. Your urinary symptoms have resolved, and your diabetes control has improved with a slight decrease in your hemoglobin A1c. We also discussed your family history and lifestyle habits.    YOUR PLAN:  -TYPE 2 DIABETES MELLITUS WITH HYPERGLYCEMIA: Type 2 diabetes is a condition where your body does not use insulin properly, leading to high blood sugar levels. Your hemoglobin A1c has improved from 7.6% to 7.3%, showing better blood sugar control. Continue taking Rybelsus and sitagliptin, and keep up with your dietary changes, especially reducing sugar and carbohydrates. We will check your hemoglobin A1c again in three months. Regular physical activity is also recommended to help manage your diabetes.    -URINARY TRACT INFECTION, RESOLVED: A urinary tract infection (UTI) is an infection in any part of your urinary system. Your symptoms have resolved after completing the antibiotic treatment. To ensure the infection is completely gone, we will order a urine culture for you to do mid-next week.    INSTRUCTIONS:  Please schedule a urine culture for mid-next week to confirm the resolution of your urinary tract infection. We will reassess your hemoglobin A1c in three months, at the end of August. Continue with your current medications and dietary modifications, and try to incorporate regular physical activity into your routine.    Contains text generated by Paulo

## 2025-05-22 NOTE — PROGRESS NOTES
Massiel Anders is a 75 year old female.  Diabetes, hypercholesterolemia, GERD, memory problem postmenopausal, osteopenia lumbar spine femoral neck and hip  HPI:     The following individual(s) verbally consented to be recorded using ambient AI listening technology and understand that they can each withdraw their consent to this listening technology at any point by asking the clinician to turn off or pause the recording:    Patient name: Massiel Anders      History of Present Illness  Massiel Anders is a 75 year old female with diabetes who presents for follow-up after antibiotic treatment and diabetes management.    She recently completed a five-day course of antibiotics for a urinary tract infection. Initially, she experienced urinary symptoms, which have since resolved following the antibiotic treatment. The burning sensation in her back, attributed to the infection, has also subsided after completing the medication. No current urinary symptoms are reported.    She is being monitored for diabetes, with recent blood work showing a hemoglobin A1c of 7.3%, down from 7.6% previously. She has been actively managing her diet since March, aiming to reduce her weight from 120 pounds to 115 pounds. She is cautious with her diet, particularly avoiding excess sugar and carbohydrates, and has lost weight as a result. Her current medications for diabetes include Rybelsus and sitagliptin.    Her family history is significant for breast cancer, as her sister was recently diagnosed and is now in remission. This has prompted her to ensure regular mammograms, with her most recent one being normal. Her mother passed away at the age of 99, indicating a family history of longevity.    In terms of social history, she engages in minimal physical activity, primarily walking her dogs, and has noted that weather conditions often limit her outdoor activities.     Hypercholesterolemia patient is taking rosuvastatin 10 mg  daily doing well with medication.  Continue current medication watching diet    GERD taking famotidine stable asymptomatic    Postmenopausal with osteopenia of the lumbar spine femoral neck and hip.  Patient will start taking calcium and vitamin D supplementation.  Increase activity.  Check DEXA scan in 2 years.    Left carotid artery atherosclerosis ultrasound was recently done    Stay on calcium vitamin D supplementation.  She was on aspirin but could not tolerate it because of the nosebleeds she will try to do it every other day.      Current Outpatient Medications   Medication Sig Dispense Refill    SITagliptin Phosphate 100 MG Oral Tab Take 1 tablet (100 mg total) by mouth daily. 90 tablet 1    Semaglutide (RYBELSUS) 14 MG Oral Tab Take 1 tablet by mouth daily. 90 tablet 1    rosuvastatin 10 MG Oral Tab Take 1 tablet (10 mg total) by mouth daily. 90 tablet 1    famotidine 40 MG Oral Tab Take 1 tablet (40 mg total) by mouth 2 (two) times daily. 180 tablet 1    FreeStyle Lancets Does not apply Misc Test  3times daily 100 each 2    Glucose Blood In Vitro Strip Test 3 times daily 100 each 2      Past Medical History:    Allergic rhinitis    Arthritis    Chronic cough    frequent several times a year    Diabetes (HCC)    Esophageal reflux    Heartburn    History of cardiac murmur    Leaking of urine    Obesity    Sleep apnea    Sleep disturbance      Social History:  Social History     Socioeconomic History    Marital status:    Tobacco Use    Smoking status: Never    Smokeless tobacco: Never   Vaping Use    Vaping status: Never Used   Substance and Sexual Activity    Alcohol use: Yes     Alcohol/week: 2.0 standard drinks of alcohol     Types: 2 Glasses of wine per week    Drug use: No    Sexual activity: Yes     Partners: Male   Other Topics Concern    Caffeine Concern No    Stress Concern No    Weight Concern Yes    Special Diet No    Exercise Yes    Seat Belt Yes    Self-Exams No     Social Drivers of  Health     Food Insecurity: No Food Insecurity (3/11/2025)    NCSS - Food Insecurity     Worried About Running Out of Food in the Last Year: No     Ran Out of Food in the Last Year: No   Transportation Needs: No Transportation Needs (3/11/2025)    NCSS - Transportation     Lack of Transportation: No   Housing Stability: Not At Risk (3/11/2025)    NCSS - Housing/Utilities     Has Housing: Yes     Worried About Losing Housing: No     Unable to Get Utilities: No        REVIEW OF SYSTEMS:   GENERAL HEALTH: feels well otherwise  SKIN: denies any unusual skin lesions or rashes  HEENT no congestion no runny nose no sore throat  Neck no neck pain  RESPIRATORY: denies shortness of breath with exertion  CARDIOVASCULAR: denies chest pain on exertion  GI: denies abdominal pain and denies heartburn  NEURO: denies headaches  Psych normal mood.    EXAM:   /75 (BP Location: Left arm, Patient Position: Sitting, Cuff Size: adult)   Pulse 70   Temp 96.8 °F (36 °C) (Temporal)   Resp 12   Ht 4' 10.7\" (1.491 m)   Wt 113 lb (51.3 kg)   BMI 23.06 kg/m²   GENERAL: well developed, well nourished,in no apparent distress  SKIN: no rashes,no suspicious lesions  HEENT: atraumatic, normocephalic,ears and throat are clear  NECK: supple,no adenopathy  LUNGS: clear to auscultation  CARDIO: RRR normal S1-S2, 1 out of 6 systolic ejection murmur  GI: good BS's,no masses, HSM or tenderness  EXTREMITIES: no edema  Psychiatric - alert  and oriented x3, normal mood         Narrative   PROCEDURE:  US CAROTID DOPPLER BILAT - DIAG IMG (CPT=93880)     COMPARISON:  None.     INDICATIONS:  I65.22 Asymptomatic carotid artery narrowing without infarction, left     TECHNIQUE:  Real-time gray-scale and duplex ultrasound was used to evaluate the bilateral extracranial carotid and vertebral arteries.  B-mode 2-dimensional images of the vascular structures, Doppler spectral analysis, and color flow Doppler imaging were   performed.  Stenosis measurements  obtained in this exam were performed using Consensus Panel categories and NASCET criteria     PATIENT STATED HISTORY: (As transcribed by Technologist)  Carotid plaque.         FINDINGS:       IMAGE FINDINGS:  Calcified smooth plaque on the left carotid bulb     HEART RATE:      Regular.     VELOCITY MEASUREMENTS:                                 Right CCA Peak Systolic Velocity:  47.02 cm/s              Right Proximal ICA Peak Systolic Velocity:  42.63 cm/s              Right Mid ICA Peak Systolic Velocity:  53.30 cm/s              Right Distal ICA Peak Systolic Velocity:  60.97 cm/s              Right ICA/CCA Velocity Ratio:  1.30                              Left CCA Peak Systolic Velocity:  60.09 cm/s              Left Proximal ICA Peak Systolic Velocity:  57.46 cm/s              Left Mid ICA Peak Systolic Velocity:  81.23 cm/s              Left Distal ICA Peak Systolic Velocity:  80.11 cm/s              Left ICA/CCA Velocity Ratio:  1.35     The vertebral arteries demonstrate antegrade flow.                   Impression   CONCLUSION:  No hemodynamically significant stenosis, with carotid velocities falling within the 0-50% stenosis range bilaterally, with the above listed velocities, according to NASCET criteria.        LOCATION:  [facility]              Dictated by (CST): Moses Lamas MD on 5/13/2025 at 11:55 AM      Finalized by (CST): Moses Lamas MD on 5/13/2025 at 11:55 AM       Results for orders placed or performed in visit on 05/12/25   Vitamin B12    Collection Time: 05/12/25  7:27 AM   Result Value Ref Range    Vitamin B12 478 211 - 911 pg/mL   CBC With Differential With Platelet    Collection Time: 05/12/25  7:27 AM   Result Value Ref Range    WBC 5.8 4.0 - 11.0 x10(3) uL    RBC 4.52 3.80 - 5.30 x10(6)uL    HGB 13.3 12.0 - 16.0 g/dL    HCT 41.1 35.0 - 48.0 %    .0 150.0 - 450.0 10(3)uL    MCV 90.9 80.0 - 100.0 fL    MCH 29.4 26.0 - 34.0 pg    MCHC 32.4 31.0 - 37.0 g/dL    RDW 14.6 %     Neutrophil Absolute Prelim 3.18 1.50 - 7.70 x10 (3) uL    Neutrophil Absolute 3.18 1.50 - 7.70 x10(3) uL    Lymphocyte Absolute 1.84 1.00 - 4.00 x10(3) uL    Monocyte Absolute 0.48 0.10 - 1.00 x10(3) uL    Eosinophil Absolute 0.24 0.00 - 0.70 x10(3) uL    Basophil Absolute 0.09 0.00 - 0.20 x10(3) uL    Immature Granulocyte Absolute 0.01 0.00 - 1.00 x10(3) uL    Neutrophil % 54.5 %    Lymphocyte % 31.5 %    Monocyte % 8.2 %    Eosinophil % 4.1 %    Basophil % 1.5 %    Immature Granulocyte % 0.2 %   Comp Metabolic Panel (14)    Collection Time: 05/12/25  7:27 AM   Result Value Ref Range    Glucose 137 (H) 70 - 99 mg/dL    Sodium 137 136 - 145 mmol/L    Potassium 4.4 3.5 - 5.1 mmol/L    Chloride 108 98 - 112 mmol/L    CO2 29.0 21.0 - 32.0 mmol/L    Anion Gap 0 0 - 18 mmol/L    BUN 14 9 - 23 mg/dL    Creatinine 0.68 0.55 - 1.02 mg/dL    Calcium, Total 9.8 8.7 - 10.6 mg/dL    Calculated Osmolality 287 275 - 295 mOsm/kg    eGFR-Cr 90 >=60 mL/min/1.73m2    AST 20 <34 U/L    ALT 21 10 - 49 U/L    Alkaline Phosphatase 73 55 - 142 U/L    Bilirubin, Total 0.6 0.2 - 1.1 mg/dL    Total Protein 6.9 5.7 - 8.2 g/dL    Albumin 4.4 3.2 - 4.8 g/dL    Globulin  2.5 2.0 - 3.5 g/dL    A/G Ratio 1.8 1.0 - 2.0    Patient Fasting for CMP? Yes    Hemoglobin A1C    Collection Time: 05/12/25  7:27 AM   Result Value Ref Range    HgbA1C 7.3 (H) <5.7 %    Estimated Average Glucose 163 (H) 68 - 126 mg/dL   Lipid Panel    Collection Time: 05/12/25  7:27 AM   Result Value Ref Range    Cholesterol, Total 144 <200 mg/dL    HDL Cholesterol 68 (H) 40 - 59 mg/dL    Triglycerides 75 30 - 149 mg/dL    LDL Cholesterol 61 <100 mg/dL    VLDL 11 0 - 30 mg/dL    Non HDL Chol 76 <130 mg/dL    Patient Fasting for Lipid? Yes    Microalb/Creat Ratio, Random Urine    Collection Time: 05/12/25  7:27 AM   Result Value Ref Range    Microalbumin, Urine 0.30 mg/dL    Creatinine Ur Random 83.00 mg/dL    Malb/Cre Calc 3.6 <=30.0 ug/mg   Urinalysis with Culture Reflex     Collection Time: 05/12/25  7:27 AM    Specimen: Urine, clean catch   Result Value Ref Range    Urine Color Light-Yellow Yellow    Clarity Urine Clear Clear    Spec Gravity 1.021 1.005 - 1.030    Glucose Urine Normal Normal mg/dL    Bilirubin Urine Negative Negative    Ketones Urine Negative Negative mg/dL    Blood Urine Negative Negative    pH Urine 5.0 5.0 - 8.0    Protein Urine Negative Negative mg/dL    Urobilinogen Urine Normal Normal mg/dL    Nitrite Urine 2+ (A) Negative    Leukocyte Esterase Urine 75 (A) Negative    WBC Urine 6-10 (A) 0 - 5 /HPF    RBC Urine 0-2 0 - 2 /HPF    Bacteria Urine 1+ (A) None Seen /HPF    Squamous Epi. Cells Few (A) None Seen /HPF    Renal Tubular Epithelial Cells None Seen None Seen /HPF    Transitional Cells None Seen None Seen /HPF    Yeast Urine None Seen None Seen /HPF   TSH W Reflex To Free T4    Collection Time: 05/12/25  7:27 AM   Result Value Ref Range    TSH 1.936 0.550 - 4.780 uIU/mL   Urine Culture, Routine    Collection Time: 05/12/25  7:27 AM    Specimen: Urine, clean catch   Result Value Ref Range    Urine Culture 50,000-99,000 CFU/ML Escherichia coli (A)        Susceptibility    Escherichia coli -  (no method available)     Ampicillin 8 Sensitive      Cefazolin <=4 Sensitive      Ciprofloxacin <=0.25 Sensitive      Gentamicin <=1 Sensitive      Meropenem <=0.25 Sensitive      Levofloxacin <=0.12 Sensitive      Nitrofurantoin <=16 Sensitive      Piperacillin + Tazobactam <=4 Sensitive      Trimethoprim/Sulfa <=20 Sensitive         ASSESSMENT AND PLAN:     Encounter Diagnoses   Name Primary?    Uncontrolled type 2 diabetes mellitus with hyperglycemia, without long-term current use of insulin (HCC) Yes    Hyperlipidemia, mixed     Heartburn     Acute cystitis without hematuria     Type 2 diabetes mellitus with hypercholesterolemia (HCC)     Gastroesophageal reflux disease without esophagitis     Left carotid stenosis      Uncontrolled diabetes -trying to watch diet and  lose some weight.  Continue current medications. Recheck blood work in 3 months.      Diabetes with hypercholesterolemia as above.    Hyperlipidemia on rosuvastatin continue present management monitor blood work    Heartburn on famotidine continue medication stable watch diet    Atherosclerosis/narrowingLeft carotid start taking aspirin every other day monitor.    Postmenopausal/osteopenia of the lumbar/osteopenia of the femoral neck/osteopenia of the hip stay calcium vitamin D supplementation weightbearing exercise        Orders Placed This Encounter   Procedures    Microalb/Creat Ratio, Random Urine    Comp Metabolic Panel (14)    Hemoglobin A1C    Lipid Panel    Urine Culture, Routine [E]       Meds & Refills for this Visit:  Requested Prescriptions     Signed Prescriptions Disp Refills    SITagliptin Phosphate 100 MG Oral Tab 90 tablet 1     Sig: Take 1 tablet (100 mg total) by mouth daily.    Semaglutide (RYBELSUS) 14 MG Oral Tab 90 tablet 1     Sig: Take 1 tablet by mouth daily.    rosuvastatin 10 MG Oral Tab 90 tablet 1     Sig: Take 1 tablet (10 mg total) by mouth daily.    famotidine 40 MG Oral Tab 180 tablet 1     Sig: Take 1 tablet (40 mg total) by mouth 2 (two) times daily.     Continue current meds.   Watch diet for fats and carbs.   Stay active.      VISIT SUMMARY:  Today, we reviewed your recent antibiotic treatment for a urinary tract infection and your ongoing diabetes management. Your urinary symptoms have resolved, and your diabetes control has improved with a slight decrease in your hemoglobin A1c. We also discussed your family history and lifestyle habits.    YOUR PLAN:  -TYPE 2 DIABETES MELLITUS WITH HYPERGLYCEMIA: Type 2 diabetes is a condition where your body does not use insulin properly, leading to high blood sugar levels. Your hemoglobin A1c has improved from 7.6% to 7.3%, showing better blood sugar control. Continue taking Rybelsus and sitagliptin, and keep up with your dietary changes,  especially reducing sugar and carbohydrates. We will check your hemoglobin A1c again in three months. Regular physical activity is also recommended to help manage your diabetes.    -URINARY TRACT INFECTION, RESOLVED: A urinary tract infection (UTI) is an infection in any part of your urinary system. Your symptoms have resolved after completing the antibiotic treatment. To ensure the infection is completely gone, we will order a urine culture for you to do mid-next week.    INSTRUCTIONS:  Please schedule a urine culture for mid-next week to confirm the resolution of your urinary tract infection. We will reassess your hemoglobin A1c in three months, at the end of August. Continue with your current medications and dietary modifications, and try to incorporate regular physical activity into your routine.    Contains text generated by [email or web address]     Imaging & Consults:  None    The patient indicates understanding of these issues and agrees to the plan.  The patient is asked to return in August for medication check.  The note was dictated using speech recognition software.  Accuracy and grammar in transcription may be subject to error.

## 2025-06-23 ENCOUNTER — TELEPHONE (OUTPATIENT)
Dept: FAMILY MEDICINE CLINIC | Facility: CLINIC | Age: 75
End: 2025-06-23

## 2025-06-23 NOTE — TELEPHONE ENCOUNTER
Left message for patient to determine the reason for sons appointment.     No answer. Left message to call back. Will route to PCP to ask in the meantime. Thank you.

## 2025-06-23 NOTE — TELEPHONE ENCOUNTER
Patient called and wants to know if she can switch over her appointment on 06/30/25 to her son Oral Anders / [id number] who was formerly a patient of Dr Vega. Please advise.

## 2025-06-30 ENCOUNTER — LAB ENCOUNTER (OUTPATIENT)
Dept: LAB | Age: 75
End: 2025-06-30
Attending: FAMILY MEDICINE
Payer: MEDICARE

## 2025-06-30 DIAGNOSIS — N30.00 ACUTE CYSTITIS WITHOUT HEMATURIA: ICD-10-CM

## 2025-06-30 PROCEDURE — 87086 URINE CULTURE/COLONY COUNT: CPT

## 2025-06-30 PROCEDURE — 87077 CULTURE AEROBIC IDENTIFY: CPT

## 2025-06-30 PROCEDURE — 87186 SC STD MICRODIL/AGAR DIL: CPT

## 2025-07-02 RX ORDER — CEPHALEXIN 500 MG/1
500 CAPSULE ORAL 3 TIMES DAILY
Qty: 15 CAPSULE | Refills: 0 | Status: SHIPPED | OUTPATIENT
Start: 2025-07-02 | End: 2025-07-02

## 2025-07-02 RX ORDER — CEPHALEXIN 500 MG/1
500 CAPSULE ORAL 3 TIMES DAILY
Qty: 30 CAPSULE | Refills: 0 | Status: SHIPPED | OUTPATIENT
Start: 2025-07-02 | End: 2025-07-12

## 2025-08-18 ENCOUNTER — LABORATORY ENCOUNTER (OUTPATIENT)
Dept: LAB | Age: 75
End: 2025-08-18
Attending: FAMILY MEDICINE

## 2025-08-18 DIAGNOSIS — E11.65 UNCONTROLLED TYPE 2 DIABETES MELLITUS WITH HYPERGLYCEMIA, WITHOUT LONG-TERM CURRENT USE OF INSULIN (HCC): ICD-10-CM

## 2025-08-18 DIAGNOSIS — E78.2 HYPERLIPIDEMIA, MIXED: ICD-10-CM

## 2025-08-18 LAB
ALBUMIN SERPL-MCNC: 4.3 G/DL (ref 3.2–4.8)
ALBUMIN/GLOB SERPL: 1.5 (ref 1–2)
ALP LIVER SERPL-CCNC: 73 U/L (ref 55–142)
ALT SERPL-CCNC: 17 U/L (ref 10–49)
ANION GAP SERPL CALC-SCNC: 10 MMOL/L (ref 0–18)
AST SERPL-CCNC: 18 U/L (ref ?–34)
BILIRUB SERPL-MCNC: 0.6 MG/DL (ref 0.2–1.1)
BUN BLD-MCNC: 13 MG/DL (ref 9–23)
CALCIUM BLD-MCNC: 10.1 MG/DL (ref 8.7–10.6)
CHLORIDE SERPL-SCNC: 106 MMOL/L (ref 98–112)
CHOLEST SERPL-MCNC: 145 MG/DL (ref ?–200)
CO2 SERPL-SCNC: 25 MMOL/L (ref 21–32)
CREAT BLD-MCNC: 0.79 MG/DL (ref 0.55–1.02)
CREAT UR-SCNC: 93.4 MG/DL
EGFRCR SERPLBLD CKD-EPI 2021: 77 ML/MIN/1.73M2 (ref 60–?)
EST. AVERAGE GLUCOSE BLD GHB EST-MCNC: 151 MG/DL (ref 68–126)
FASTING PATIENT LIPID ANSWER: YES
FASTING STATUS PATIENT QL REPORTED: YES
GLOBULIN PLAS-MCNC: 2.8 G/DL (ref 2–3.5)
GLUCOSE BLD-MCNC: 140 MG/DL (ref 70–99)
HBA1C MFR BLD: 6.9 % (ref ?–5.7)
HDLC SERPL-MCNC: 66 MG/DL (ref 40–59)
LDLC SERPL CALC-MCNC: 60 MG/DL (ref ?–100)
MICROALBUMIN UR-MCNC: 0.4 MG/DL
MICROALBUMIN/CREAT 24H UR-RTO: 4.3 UG/MG (ref ?–30)
NONHDLC SERPL-MCNC: 79 MG/DL (ref ?–130)
OSMOLALITY SERPL CALC.SUM OF ELEC: 294 MOSM/KG (ref 275–295)
POTASSIUM SERPL-SCNC: 4.1 MMOL/L (ref 3.5–5.1)
PROT SERPL-MCNC: 7.1 G/DL (ref 5.7–8.2)
SODIUM SERPL-SCNC: 141 MMOL/L (ref 136–145)
TRIGL SERPL-MCNC: 106 MG/DL (ref 30–149)
VLDLC SERPL CALC-MCNC: 16 MG/DL (ref 0–30)

## 2025-08-18 PROCEDURE — 83036 HEMOGLOBIN GLYCOSYLATED A1C: CPT

## 2025-08-18 PROCEDURE — 82570 ASSAY OF URINE CREATININE: CPT

## 2025-08-18 PROCEDURE — 80053 COMPREHEN METABOLIC PANEL: CPT

## 2025-08-18 PROCEDURE — 80061 LIPID PANEL: CPT

## 2025-08-18 PROCEDURE — 82043 UR ALBUMIN QUANTITATIVE: CPT

## 2025-08-18 PROCEDURE — 36415 COLL VENOUS BLD VENIPUNCTURE: CPT

## (undated) DIAGNOSIS — B96.20 E. COLI URINARY TRACT INFECTION: Primary | ICD-10-CM

## (undated) DIAGNOSIS — E11.9 TYPE 2 DIABETES MELLITUS WITHOUT COMPLICATION, WITHOUT LONG-TERM CURRENT USE OF INSULIN (HCC): ICD-10-CM

## (undated) DIAGNOSIS — N39.0 URINARY TRACT INFECTION WITH HEMATURIA, SITE UNSPECIFIED: Primary | ICD-10-CM

## (undated) DIAGNOSIS — R31.9 URINARY TRACT INFECTION WITH HEMATURIA, SITE UNSPECIFIED: Primary | ICD-10-CM

## (undated) DIAGNOSIS — Z13.6 SCREENING FOR CARDIOVASCULAR CONDITION: Primary | ICD-10-CM

## (undated) DIAGNOSIS — N39.0 E. COLI URINARY TRACT INFECTION: Primary | ICD-10-CM

## (undated) DIAGNOSIS — E78.2 HYPERLIPIDEMIA, MIXED: ICD-10-CM

## (undated) NOTE — MR AVS SNAPSHOT
[facility] [address], [address] [address] [phone]               Thank you for choosing us for your health care visit with Qi Cuellar MD.  We are glad to serve you and happy to provide you with this new • History of gestational diabetes or birth of baby weighing more than 9 pounds     Covered at least every 3 years,           GLUCOSE (mg/dL)   Date Value   01/27/2016 119*   ---------- Medicare covers annually or at 6-month intervals for prediabetic patien Covered every 2 yrs after age 72    Covered yearly for Long term Glucocorticoid medication (Steroids) Requires diagnosis related to osteoporosis or estrogen deficiency.    Biennial benefit unless patient has history of long-term glucocorticoid use for [employer] visit. This may be covered with your prescription benefits, but Medicare does not cover unless Medically needed    Zoster (Not covered by Medicare Part B) No orders found for this or any previous visit.  This may be covered with your pharmacy  prescription medications prescribed for you. Read the directions carefully, and ask your doctor or other care provider to review them with you.             Today's Orders     [facility] SRIDHAR 2D+3D DIAGNOSTIC [facility]  BILAT (KST=[id number])    Complete by:  Feb 13, 2017 (Approxima requirements for authorization, please wait 5-7 days and then contact your physician's office. At that time, you will be provided with any authorization numbers or be assured that none are required. You can then schedule your appointment.  Failure to obtain Visit [facility] online at  Merged with [facility]

## (undated) NOTE — MR AVS SNAPSHOT
[facility],[address], [address] [place] [address] Rd  [phone]               Thank you for choosing us for your health care visit with Arsenio Meza, RN,CDE.   We are glad to serve you and happy to provide you with Use insulin to carbohydrate ratio/correction factor accurately Accurately treat hypoglycemia using the Rule of 15  Following the assessment, education will be customized to assessed educational needs.   Basal rates, insulin to carbohydrate and correction fa No Known Allergies                   Current Medications          This list is accurate as of: 4/6/17 11:58 AM.  Always use your most recent med list.                FREESTYLE LANCETS Misc   Test  3times daily           FREESTYLE LITE TEST Strp   Generic active are less likely to develop some chronic diseases than adults who are inactive.      HOW TO GET STARTED: HOW TO STAY MOTIVATED:   Start activities slowly and build up over time Do what you like   Get your heart pumping – brisk walking, biking, swimmin

## (undated) NOTE — MR AVS SNAPSHOT
[facility] [address], [address] [address] [phone]               Thank you for choosing us for your health care visit with Lynn Oppenheim, MD.  We are glad to serve you and happy to provide you with this new and, if already in use, will  be reviewed for accuracy and recalculated if necessary. INDIVIDUAL FOLLOW-UP APPOINTMENTS To be determined at appointment  INITIAL INDIVIDUAL ASSESSMENTS An assessment and meter training will begin this appointment.  Education Current Medications          This list is accurate as of: 4/6/17  1:59 PM.  Always use your most recent med list.                FREESTYLE LANCETS Misc   Test  3times daily           FREESTYLE LITE TEST Strp   Generic drug:  Glucose Blood   Test 3 times da Aerobic physical activity Regular aerobic physical activity (e.g., brisk walking, light jogging, cycling, swimming, etc.) for a goal of at least 150 minutes per week. Moderation of alcohol consumption Men: limit to <= 2 drinks* per day.   Women and lighte

## (undated) NOTE — MR AVS SNAPSHOT
[facility] [address], [address] [address] [phone]               Thank you for choosing us for your health care visit with Mary Singletary MD.  We are glad to serve you and happy to provide you with this new Order:   Op Referral New Onset Diabetes- No Prev Education 10 Hrs          Referral Orders      Normal Orders This Visit    OP REFERRAL NEW ONSET DIABETES- NO PREV EDUCATION 10 HRS [[id number] CUSTOM]  Order #:  [id number]         Your physician has referred physician's office. At that time, you will be provided with any authorization numbers or be assured that none are required. You can then schedule your appointment.  Failure to obtain required authorization numbers can create reimbursement difficulties for y office, you can view your past visit information in [employer] by going to Visits < Visit Summaries. [employer] questions? Call [phone] for help. [employer] is NOT to be used for urgent needs. For medical emergencies, dial 911.            Visit [facility]

## (undated) NOTE — MR AVS SNAPSHOT
[facility] [address]  [address] [address]  [phone]               Thank you for choosing us for your health care visit with Shakila Gao RN,CDE.   We are glad to serve you and happy to provide you with this summary DIAB STEP 3 CLASS with Shawnee Valentino RD,LDN,CDE   [facility] ( [facility])    [address], \[facility]\""   [phone]           TOPICS DISCUSSED IN CLASS/APPOINTMENT During clas Current Medications          This list is accurate as of: 3/3/17  2:03 PM.  Always use your most recent med list.                FREESTYLE LANCETS Misc   Test  3times daily           FREESTYLE LITE TEST Strp   Generic drug:  Glucose Blood   Test 3 times da